# Patient Record
Sex: MALE | Race: ASIAN
[De-identification: names, ages, dates, MRNs, and addresses within clinical notes are randomized per-mention and may not be internally consistent; named-entity substitution may affect disease eponyms.]

---

## 2020-04-15 ENCOUNTER — HOSPITAL ENCOUNTER (INPATIENT)
Dept: HOSPITAL 12 - ER | Age: 75
LOS: 9 days | Discharge: NURSING, REHAB OR LONG-TERM CARE | DRG: 710 | End: 2020-04-24
Attending: INTERNAL MEDICINE
Payer: MEDICAID

## 2020-04-15 VITALS — WEIGHT: 150 LBS | HEIGHT: 66 IN | BODY MASS INDEX: 24.11 KG/M2

## 2020-04-15 VITALS — SYSTOLIC BLOOD PRESSURE: 158 MMHG | DIASTOLIC BLOOD PRESSURE: 65 MMHG

## 2020-04-15 DIAGNOSIS — G93.6: ICD-10-CM

## 2020-04-15 DIAGNOSIS — C79.31: ICD-10-CM

## 2020-04-15 DIAGNOSIS — I48.91: ICD-10-CM

## 2020-04-15 DIAGNOSIS — I11.0: ICD-10-CM

## 2020-04-15 DIAGNOSIS — K62.89: ICD-10-CM

## 2020-04-15 DIAGNOSIS — Z87.891: ICD-10-CM

## 2020-04-15 DIAGNOSIS — Z85.038: ICD-10-CM

## 2020-04-15 DIAGNOSIS — A41.9: Primary | ICD-10-CM

## 2020-04-15 DIAGNOSIS — D50.9: ICD-10-CM

## 2020-04-15 DIAGNOSIS — I61.8: ICD-10-CM

## 2020-04-15 DIAGNOSIS — I25.10: ICD-10-CM

## 2020-04-15 DIAGNOSIS — K43.9: ICD-10-CM

## 2020-04-15 DIAGNOSIS — E88.09: ICD-10-CM

## 2020-04-15 DIAGNOSIS — Z66: ICD-10-CM

## 2020-04-15 DIAGNOSIS — Z79.84: ICD-10-CM

## 2020-04-15 DIAGNOSIS — G31.84: ICD-10-CM

## 2020-04-15 DIAGNOSIS — J98.11: ICD-10-CM

## 2020-04-15 DIAGNOSIS — Z90.49: ICD-10-CM

## 2020-04-15 DIAGNOSIS — E83.42: ICD-10-CM

## 2020-04-15 DIAGNOSIS — J44.0: ICD-10-CM

## 2020-04-15 DIAGNOSIS — J18.1: ICD-10-CM

## 2020-04-15 DIAGNOSIS — D68.69: ICD-10-CM

## 2020-04-15 DIAGNOSIS — R65.20: ICD-10-CM

## 2020-04-15 DIAGNOSIS — E87.5: ICD-10-CM

## 2020-04-15 DIAGNOSIS — E22.2: ICD-10-CM

## 2020-04-15 DIAGNOSIS — E44.0: ICD-10-CM

## 2020-04-15 DIAGNOSIS — G92: ICD-10-CM

## 2020-04-15 DIAGNOSIS — Z92.21: ICD-10-CM

## 2020-04-15 DIAGNOSIS — I50.32: ICD-10-CM

## 2020-04-15 DIAGNOSIS — C78.01: ICD-10-CM

## 2020-04-15 DIAGNOSIS — R22.2: ICD-10-CM

## 2020-04-15 DIAGNOSIS — G83.21: ICD-10-CM

## 2020-04-15 DIAGNOSIS — I16.0: ICD-10-CM

## 2020-04-15 DIAGNOSIS — E11.9: ICD-10-CM

## 2020-04-15 DIAGNOSIS — Z85.6: ICD-10-CM

## 2020-04-15 DIAGNOSIS — C79.89: ICD-10-CM

## 2020-04-15 LAB
ALP SERPL-CCNC: 60 U/L (ref 50–136)
ALT SERPL W/O P-5'-P-CCNC: 17 U/L (ref 16–63)
APPEARANCE UR: CLEAR
AST SERPL-CCNC: 15 U/L (ref 15–37)
BASOPHILS # BLD AUTO: 0.1 K/UL (ref 0–8)
BASOPHILS NFR BLD AUTO: 0.9 % (ref 0–2)
BILIRUB DIRECT SERPL-MCNC: 0.2 MG/DL (ref 0–0.2)
BILIRUB SERPL-MCNC: 0.6 MG/DL (ref 0.2–1)
BILIRUB UR QL STRIP: NEGATIVE
BUN SERPL-MCNC: 14 MG/DL (ref 7–18)
CHLORIDE SERPL-SCNC: 89 MMOL/L (ref 98–107)
CO2 SERPL-SCNC: 25 MMOL/L (ref 21–32)
COLOR UR: YELLOW
CREAT SERPL-MCNC: 0.9 MG/DL (ref 0.6–1.3)
EOSINOPHIL # BLD AUTO: 0.4 K/UL (ref 0–0.7)
EOSINOPHIL NFR BLD AUTO: 4.7 % (ref 0–7)
GLUCOSE SERPL-MCNC: 132 MG/DL (ref 74–106)
GLUCOSE UR STRIP-MCNC: NEGATIVE MG/DL
HCT VFR BLD AUTO: 29.9 % (ref 36.7–47.1)
HGB BLD-MCNC: 10.2 G/DL (ref 12.5–16.3)
HGB UR QL STRIP: NEGATIVE
KETONES UR STRIP-MCNC: (no result) MG/DL
LEUKOCYTE ESTERASE UR QL STRIP: NEGATIVE
LIPASE SERPL-CCNC: 114 U/L (ref 73–393)
LYMPHOCYTES # BLD AUTO: 1.3 K/UL (ref 20–40)
LYMPHOCYTES NFR BLD AUTO: 15.5 % (ref 20.5–51.5)
MCH RBC QN AUTO: 26.8 UUG (ref 23.8–33.4)
MCHC RBC AUTO-ENTMCNC: 34 G/DL (ref 32.5–36.3)
MCV RBC AUTO: 78.3 FL (ref 73–96.2)
MONOCYTES # BLD AUTO: 0.6 K/UL (ref 2–10)
MONOCYTES NFR BLD AUTO: 7.3 % (ref 0–11)
NEUTROPHILS # BLD AUTO: 6.2 K/UL (ref 1.8–8.9)
NEUTROPHILS NFR BLD AUTO: 71.6 % (ref 38.5–71.5)
NITRITE UR QL STRIP: NEGATIVE
PH UR STRIP: 7 [PH] (ref 5–8)
PLATELET # BLD AUTO: 279 K/UL (ref 152–348)
POTASSIUM SERPL-SCNC: 3.8 MMOL/L (ref 3.5–5.1)
RBC # BLD AUTO: 3.82 MIL/UL (ref 4.06–5.63)
RBC #/AREA URNS HPF: (no result) /HPF (ref 0–3)
SP GR UR STRIP: 1.02 (ref 1–1.03)
UROBILINOGEN UR STRIP-MCNC: 0.2 E.U./DL
WBC # BLD AUTO: 8.7 K/UL (ref 3.6–10.2)
WBC #/AREA URNS HPF: (no result) /HPF (ref 0–3)
WS STN SPEC: 7.3 G/DL (ref 6.4–8.2)

## 2020-04-15 PROCEDURE — A4663 DIALYSIS BLOOD PRESSURE CUFF: HCPCS

## 2020-04-15 PROCEDURE — G0378 HOSPITAL OBSERVATION PER HR: HCPCS

## 2020-04-15 RX ADMIN — SODIUM CHLORIDE PRN MLS/HR: 0.9 INJECTION, SOLUTION INTRAVENOUS at 22:52

## 2020-04-15 NOTE — NUR
Dr. Dunlap on panel call with Ajit Moore DNP. Patient accepted for admission 
to [facility], diagnosis: pneumonia and hypertension.

## 2020-04-16 VITALS — DIASTOLIC BLOOD PRESSURE: 86 MMHG | SYSTOLIC BLOOD PRESSURE: 191 MMHG

## 2020-04-16 VITALS — DIASTOLIC BLOOD PRESSURE: 65 MMHG | SYSTOLIC BLOOD PRESSURE: 156 MMHG

## 2020-04-16 VITALS — SYSTOLIC BLOOD PRESSURE: 160 MMHG | DIASTOLIC BLOOD PRESSURE: 65 MMHG

## 2020-04-16 VITALS — DIASTOLIC BLOOD PRESSURE: 69 MMHG | SYSTOLIC BLOOD PRESSURE: 160 MMHG

## 2020-04-16 VITALS — SYSTOLIC BLOOD PRESSURE: 138 MMHG | DIASTOLIC BLOOD PRESSURE: 60 MMHG

## 2020-04-16 LAB
ALP SERPL-CCNC: 57 U/L (ref 50–136)
ALT SERPL W/O P-5'-P-CCNC: 17 U/L (ref 16–63)
AST SERPL-CCNC: 16 U/L (ref 15–37)
BASOPHILS # BLD AUTO: 0 K/UL (ref 0–8)
BASOPHILS NFR BLD AUTO: 0.5 % (ref 0–2)
BILIRUB SERPL-MCNC: 0.6 MG/DL (ref 0.2–1)
BUN SERPL-MCNC: 9 MG/DL (ref 7–18)
CHLORIDE SERPL-SCNC: 90 MMOL/L (ref 98–107)
CHOLEST SERPL-MCNC: 147 MG/DL (ref ?–200)
CO2 SERPL-SCNC: 24 MMOL/L (ref 21–32)
CREAT SERPL-MCNC: 0.7 MG/DL (ref 0.6–1.3)
EOSINOPHIL # BLD AUTO: 0.5 K/UL (ref 0–0.7)
EOSINOPHIL NFR BLD AUTO: 6 % (ref 0–7)
GLUCOSE SERPL-MCNC: 139 MG/DL (ref 74–106)
HCT VFR BLD AUTO: 29.4 % (ref 36.7–47.1)
HDLC SERPL-MCNC: 56 MG/DL (ref 40–60)
HGB BLD-MCNC: 10.1 G/DL (ref 12.5–16.3)
LYMPHOCYTES # BLD AUTO: 0.9 K/UL (ref 20–40)
LYMPHOCYTES NFR BLD AUTO: 10.3 % (ref 20.5–51.5)
MAGNESIUM SERPL-MCNC: 1.7 MG/DL (ref 1.8–2.4)
MCH RBC QN AUTO: 27 UUG (ref 23.8–33.4)
MCHC RBC AUTO-ENTMCNC: 34 G/DL (ref 32.5–36.3)
MCV RBC AUTO: 78.9 FL (ref 73–96.2)
MONOCYTES # BLD AUTO: 0.6 K/UL (ref 2–10)
MONOCYTES NFR BLD AUTO: 6.5 % (ref 0–11)
NEUTROPHILS # BLD AUTO: 6.6 K/UL (ref 1.8–8.9)
NEUTROPHILS NFR BLD AUTO: 76.7 % (ref 38.5–71.5)
PHOSPHATE SERPL-MCNC: 3 MG/DL (ref 2.5–4.9)
PLATELET # BLD AUTO: 248 K/UL (ref 152–348)
POTASSIUM SERPL-SCNC: 3.8 MMOL/L (ref 3.5–5.1)
RBC # BLD AUTO: 3.72 MIL/UL (ref 4.06–5.63)
TRIGL SERPL-MCNC: 65 MG/DL (ref 30–150)
TSH SERPL DL<=0.005 MIU/L-ACNC: 0.63 MIU/ML (ref 0.36–3.74)
WBC # BLD AUTO: 8.6 K/UL (ref 3.6–10.2)
WS STN SPEC: 6.7 G/DL (ref 6.4–8.2)

## 2020-04-16 RX ADMIN — Medication SCH EACH: at 21:06

## 2020-04-16 RX ADMIN — Medication SCH EACH: at 17:26

## 2020-04-16 RX ADMIN — AMLODIPINE BESYLATE SCH MG: 5 TABLET ORAL at 17:26

## 2020-04-16 RX ADMIN — ASPIRIN SCH MG: 81 TABLET, CHEWABLE ORAL at 09:32

## 2020-04-16 RX ADMIN — SODIUM CHLORIDE PRN MLS/HR: 0.9 INJECTION, SOLUTION INTRAVENOUS at 23:40

## 2020-04-16 RX ADMIN — PANTOPRAZOLE SODIUM SCH MG: 40 TABLET, DELAYED RELEASE ORAL at 09:33

## 2020-04-16 RX ADMIN — Medication SCH EACH: at 11:50

## 2020-04-16 RX ADMIN — Medication SCH EACH: at 06:32

## 2020-04-16 RX ADMIN — FLUTICASONE FUROATE AND VILANTEROL TRIFENATATE SCH EACH: 100; 25 POWDER RESPIRATORY (INHALATION) at 10:42

## 2020-04-16 RX ADMIN — DEXTROSE SCH MLS/HR: 5 SOLUTION INTRAVENOUS at 22:20

## 2020-04-16 RX ADMIN — SODIUM CHLORIDE PRN UNIT: 9 INJECTION, SOLUTION INTRAVENOUS at 08:51

## 2020-04-16 RX ADMIN — SODIUM CHLORIDE PRN UNIT: 9 INJECTION, SOLUTION INTRAVENOUS at 20:57

## 2020-04-16 RX ADMIN — MAGNESIUM HYDROXIDE PRN ML: 400 SUSPENSION ORAL at 20:57

## 2020-04-16 RX ADMIN — DEXTROSE SCH MLS/HR: 50 INJECTION, SOLUTION INTRAVENOUS at 20:11

## 2020-04-16 RX ADMIN — MAGNESIUM SULFATE IN DEXTROSE SCH MLS/HR: 10 INJECTION, SOLUTION INTRAVENOUS at 12:06

## 2020-04-16 RX ADMIN — SODIUM CHLORIDE PRN UNIT: 9 INJECTION, SOLUTION INTRAVENOUS at 17:28

## 2020-04-16 RX ADMIN — MAGNESIUM SULFATE IN DEXTROSE SCH MLS/HR: 10 INJECTION, SOLUTION INTRAVENOUS at 10:43

## 2020-04-16 NOTE — NUR
received pt. resting in bed alert oriented x4. Pt. denies pain/ discomfort. Pt. denies sob/ 
difficulty breathing. IV in R forearm 20 gauge intact patent running prescribed fluids. Pt. 
is on tele monitor sinus rhythm. Upon checking vital signs BP elevated at 191/86 HR 93. Pt. 
is asymptomatic, pt. states he takes BP medications. Informed Hospitalist Oscar of 
elevated BP and home meds that need to be reconciled. Safety measures in place. call light 
within reach. will continue to monitor pt.

## 2020-04-16 NOTE — NUR
Patient is resting comfortably in bed, A&Ox4. Pt is in room air and denies sob, no signs of 
respiratory distress. Pt denies any pain. Pt is on tele monitor, sinus rhythm. Last BP check 
was 160/69. BP medications has been taken. Total urine output was 950ml. Safety measures in 
place, bed in lowest position, bed alarm on, call light within reach. Will continue to 
monitor.

## 2020-04-16 NOTE — NUR
awake,up in the bathroom to have a bowel movement. no bm noted.unable to have a bowel 
movement . prune juice and tea given plus milk of magnesia.coughing non productive cough,sob 
upon movement. vital signs stable.. up and about in the commode.verbalized very weak.

## 2020-04-16 NOTE — NUR
Admitted  Mr Lobo to room 306; comfortable on room air; SOB on exertion; pt has urinary 
urgency and would prefer to urinate standing up and use the urinal; diminished breath 
sounds; SR on tele; DR Calle was called to give orders for HHN and accucheck; orders given; 
will endorse to next RN to follow up with home meds; needs attended; safety maintained. plan 
of care initiated; continue to monitor.

## 2020-04-17 VITALS — DIASTOLIC BLOOD PRESSURE: 63 MMHG | SYSTOLIC BLOOD PRESSURE: 150 MMHG

## 2020-04-17 VITALS — DIASTOLIC BLOOD PRESSURE: 79 MMHG | SYSTOLIC BLOOD PRESSURE: 164 MMHG

## 2020-04-17 VITALS — SYSTOLIC BLOOD PRESSURE: 148 MMHG | DIASTOLIC BLOOD PRESSURE: 72 MMHG

## 2020-04-17 VITALS — DIASTOLIC BLOOD PRESSURE: 65 MMHG | SYSTOLIC BLOOD PRESSURE: 149 MMHG

## 2020-04-17 VITALS — SYSTOLIC BLOOD PRESSURE: 145 MMHG | DIASTOLIC BLOOD PRESSURE: 65 MMHG

## 2020-04-17 LAB
ALP SERPL-CCNC: 62 U/L (ref 50–136)
ALT SERPL W/O P-5'-P-CCNC: 17 U/L (ref 16–63)
AST SERPL-CCNC: 13 U/L (ref 15–37)
BASOPHILS # BLD AUTO: 0 K/UL (ref 0–8)
BASOPHILS NFR BLD AUTO: 0.5 % (ref 0–2)
BILIRUB SERPL-MCNC: 0.6 MG/DL (ref 0.2–1)
BUN SERPL-MCNC: 10 MG/DL (ref 7–18)
CHLORIDE SERPL-SCNC: 88 MMOL/L (ref 98–107)
CO2 SERPL-SCNC: 25 MMOL/L (ref 21–32)
CREAT SERPL-MCNC: 0.8 MG/DL (ref 0.6–1.3)
EOSINOPHIL # BLD AUTO: 0.4 K/UL (ref 0–0.7)
EOSINOPHIL NFR BLD AUTO: 3.9 % (ref 0–7)
GLUCOSE SERPL-MCNC: 283 MG/DL (ref 74–106)
HCT VFR BLD AUTO: 31.3 % (ref 36.7–47.1)
HGB BLD-MCNC: 10.6 G/DL (ref 12.5–16.3)
LYMPHOCYTES # BLD AUTO: 0.9 K/UL (ref 20–40)
LYMPHOCYTES NFR BLD AUTO: 9.7 % (ref 20.5–51.5)
MAGNESIUM SERPL-MCNC: 2 MG/DL (ref 1.8–2.4)
MCH RBC QN AUTO: 26.5 UUG (ref 23.8–33.4)
MCHC RBC AUTO-ENTMCNC: 34 G/DL (ref 32.5–36.3)
MCV RBC AUTO: 78.7 FL (ref 73–96.2)
MONOCYTES # BLD AUTO: 0.5 K/UL (ref 2–10)
MONOCYTES NFR BLD AUTO: 5 % (ref 0–11)
NEUTROPHILS # BLD AUTO: 7.9 K/UL (ref 1.8–8.9)
NEUTROPHILS NFR BLD AUTO: 80.9 % (ref 38.5–71.5)
PHOSPHATE SERPL-MCNC: 2.8 MG/DL (ref 2.5–4.9)
PLATELET # BLD AUTO: 278 K/UL (ref 152–348)
POTASSIUM SERPL-SCNC: 3.7 MMOL/L (ref 3.5–5.1)
RBC # BLD AUTO: 3.98 MIL/UL (ref 4.06–5.63)
WBC # BLD AUTO: 9.7 K/UL (ref 3.6–10.2)
WS STN SPEC: 7.1 G/DL (ref 6.4–8.2)

## 2020-04-17 RX ADMIN — IPRATROPIUM BROMIDE PRN MG: 0.5 SOLUTION RESPIRATORY (INHALATION) at 14:32

## 2020-04-17 RX ADMIN — LOSARTAN POTASSIUM SCH MG: 25 TABLET, FILM COATED ORAL at 21:10

## 2020-04-17 RX ADMIN — LOSARTAN POTASSIUM SCH MG: 25 TABLET, FILM COATED ORAL at 08:55

## 2020-04-17 RX ADMIN — DEXTROSE SCH MLS/HR: 50 INJECTION, SOLUTION INTRAVENOUS at 20:09

## 2020-04-17 RX ADMIN — DEXTROSE SCH MLS/HR: 5 SOLUTION INTRAVENOUS at 21:20

## 2020-04-17 RX ADMIN — Medication SCH EACH: at 16:30

## 2020-04-17 RX ADMIN — ACETAMINOPHEN PRN MG: 325 TABLET ORAL at 20:10

## 2020-04-17 RX ADMIN — AMLODIPINE BESYLATE SCH MG: 5 TABLET ORAL at 17:30

## 2020-04-17 RX ADMIN — Medication SCH EACH: at 21:16

## 2020-04-17 RX ADMIN — SODIUM CHLORIDE PRN UNIT: 9 INJECTION, SOLUTION INTRAVENOUS at 17:41

## 2020-04-17 RX ADMIN — Medication SCH EACH: at 11:57

## 2020-04-17 RX ADMIN — ASPIRIN SCH MG: 81 TABLET, CHEWABLE ORAL at 08:11

## 2020-04-17 RX ADMIN — Medication SCH EACH: at 07:06

## 2020-04-17 RX ADMIN — SODIUM CHLORIDE PRN UNIT: 9 INJECTION, SOLUTION INTRAVENOUS at 21:23

## 2020-04-17 RX ADMIN — SODIUM CHLORIDE PRN UNIT: 9 INJECTION, SOLUTION INTRAVENOUS at 08:15

## 2020-04-17 RX ADMIN — ALBUTEROL SULFATE PRN MG: 2.5 SOLUTION RESPIRATORY (INHALATION) at 14:32

## 2020-04-17 RX ADMIN — PANTOPRAZOLE SODIUM SCH MG: 40 TABLET, DELAYED RELEASE ORAL at 06:45

## 2020-04-17 RX ADMIN — DILTIAZEM HYDROCHLORIDE SCH MG: 120 CAPSULE, EXTENDED RELEASE ORAL at 07:08

## 2020-04-17 RX ADMIN — AMLODIPINE BESYLATE SCH MG: 5 TABLET ORAL at 08:12

## 2020-04-17 RX ADMIN — FLUTICASONE FUROATE AND VILANTEROL TRIFENATATE SCH EACH: 100; 25 POWDER RESPIRATORY (INHALATION) at 08:13

## 2020-04-17 NOTE — NUR
pt. resting in bed alert oriented x4. Pt. denies pain/ discomfort. Pt. denies sob/ 
difficulty breathing. IV in R forearm 20 gauge intact patent. Pt. is on tele monitor sinus 
rhythm.  Safety measures in place. call light within reach. will continue to monitor pt.

## 2020-04-18 VITALS — SYSTOLIC BLOOD PRESSURE: 138 MMHG | DIASTOLIC BLOOD PRESSURE: 53 MMHG

## 2020-04-18 VITALS — SYSTOLIC BLOOD PRESSURE: 136 MMHG | DIASTOLIC BLOOD PRESSURE: 61 MMHG

## 2020-04-18 VITALS — DIASTOLIC BLOOD PRESSURE: 53 MMHG | SYSTOLIC BLOOD PRESSURE: 141 MMHG

## 2020-04-18 VITALS — SYSTOLIC BLOOD PRESSURE: 135 MMHG | DIASTOLIC BLOOD PRESSURE: 56 MMHG

## 2020-04-18 LAB
ALP SERPL-CCNC: 58 U/L (ref 50–136)
ALT SERPL W/O P-5'-P-CCNC: 13 U/L (ref 16–63)
AST SERPL-CCNC: 20 U/L (ref 15–37)
BASOPHILS # BLD AUTO: 0 K/UL (ref 0–8)
BASOPHILS NFR BLD AUTO: 0.5 % (ref 0–2)
BILIRUB SERPL-MCNC: 0.4 MG/DL (ref 0.2–1)
BUN SERPL-MCNC: 7 MG/DL (ref 7–18)
CHLORIDE SERPL-SCNC: 91 MMOL/L (ref 98–107)
CO2 SERPL-SCNC: 28 MMOL/L (ref 21–32)
CREAT SERPL-MCNC: 0.8 MG/DL (ref 0.6–1.3)
EOSINOPHIL # BLD AUTO: 0.6 K/UL (ref 0–0.7)
EOSINOPHIL NFR BLD AUTO: 6 % (ref 0–7)
FERRITIN SERPL-MCNC: 208 NG/ML (ref 26–388)
GLUCOSE SERPL-MCNC: 125 MG/DL (ref 74–106)
HCT VFR BLD AUTO: 30.3 % (ref 36.7–47.1)
HEMOCCULT STL QL: NEGATIVE
HGB BLD-MCNC: 10.3 G/DL (ref 12.5–16.3)
IRON SERPL-MCNC: 21 UG/DL (ref 50–175)
LYMPHOCYTES # BLD AUTO: 1.1 K/UL (ref 20–40)
LYMPHOCYTES NFR BLD AUTO: 11.5 % (ref 20.5–51.5)
MAGNESIUM SERPL-MCNC: 2 MG/DL (ref 1.8–2.4)
MCH RBC QN AUTO: 26.6 UUG (ref 23.8–33.4)
MCHC RBC AUTO-ENTMCNC: 34 G/DL (ref 32.5–36.3)
MCV RBC AUTO: 77.8 FL (ref 73–96.2)
MONOCYTES # BLD AUTO: 0.6 K/UL (ref 2–10)
MONOCYTES NFR BLD AUTO: 6.1 % (ref 0–11)
NEUTROPHILS # BLD AUTO: 7 K/UL (ref 1.8–8.9)
NEUTROPHILS NFR BLD AUTO: 75.9 % (ref 38.5–71.5)
PHOSPHATE SERPL-MCNC: 3.6 MG/DL (ref 2.5–4.9)
PLATELET # BLD AUTO: 252 K/UL (ref 152–348)
POTASSIUM SERPL-SCNC: 3.8 MMOL/L (ref 3.5–5.1)
RBC # BLD AUTO: 3.89 MIL/UL (ref 4.06–5.63)
TSH SERPL DL<=0.005 MIU/L-ACNC: 0.72 MIU/ML (ref 0.36–3.74)
WBC # BLD AUTO: 9.2 K/UL (ref 3.6–10.2)
WS STN SPEC: 6.9 G/DL (ref 6.4–8.2)

## 2020-04-18 RX ADMIN — ASPIRIN SCH MG: 81 TABLET, CHEWABLE ORAL at 08:28

## 2020-04-18 RX ADMIN — SODIUM CHLORIDE PRN UNIT: 9 INJECTION, SOLUTION INTRAVENOUS at 07:54

## 2020-04-18 RX ADMIN — DEXTROSE SCH MLS/HR: 50 INJECTION, SOLUTION INTRAVENOUS at 20:29

## 2020-04-18 RX ADMIN — LOSARTAN POTASSIUM SCH MG: 25 TABLET, FILM COATED ORAL at 08:28

## 2020-04-18 RX ADMIN — FLUTICASONE FUROATE AND VILANTEROL TRIFENATATE SCH EACH: 100; 25 POWDER RESPIRATORY (INHALATION) at 09:03

## 2020-04-18 RX ADMIN — PANTOPRAZOLE SODIUM SCH MG: 40 TABLET, DELAYED RELEASE ORAL at 06:12

## 2020-04-18 RX ADMIN — AMLODIPINE BESYLATE SCH MG: 5 TABLET ORAL at 16:46

## 2020-04-18 RX ADMIN — DEXTROSE SCH MLS/HR: 5 SOLUTION INTRAVENOUS at 21:23

## 2020-04-18 RX ADMIN — Medication SCH EACH: at 11:30

## 2020-04-18 RX ADMIN — AMLODIPINE BESYLATE SCH MG: 5 TABLET ORAL at 08:28

## 2020-04-18 RX ADMIN — LOSARTAN POTASSIUM SCH MG: 25 TABLET, FILM COATED ORAL at 21:13

## 2020-04-18 RX ADMIN — Medication SCH EACH: at 16:19

## 2020-04-18 RX ADMIN — Medication SCH EACH: at 20:46

## 2020-04-18 RX ADMIN — DILTIAZEM HYDROCHLORIDE SCH MG: 120 CAPSULE, EXTENDED RELEASE ORAL at 08:28

## 2020-04-18 RX ADMIN — SODIUM CHLORIDE PRN UNIT: 9 INJECTION, SOLUTION INTRAVENOUS at 11:52

## 2020-04-18 RX ADMIN — Medication SCH EACH: at 06:30

## 2020-04-18 NOTE — NUR
Patient in bed, awake, alert and verbally responsive. No signs of distress noted. No SON. 
Pain medication given as ordered for Right Chest Pain, Seen and examined by Dr. Landis 
with STAT EKG ordered. EKG resulted Normal Sinus Rhythm, Dr. Landis is aware. Last Blood 
Sugar is 122, No insulin given per sliding scale. All needs attended and met. kept clean and 
comfortable. Will endorse to Oncoming Nurse.

## 2020-04-18 NOTE — NUR
Patient spoke with Dr. Velez on the phone, asking if he wants to do any colonoscopy 
procedure, patient refused.

## 2020-04-18 NOTE — NUR
Received patient in bed, awake and verbally responsive. No signs of distress noted. No SOB. 
No complain at this time. 0600 Blood sugar is 148, will give 2 units insulin per sliding 
scale before breakfast. Kept clean and comfortable. kept the call light within easy reach. 
Will continue to monitor.

## 2020-04-19 VITALS — SYSTOLIC BLOOD PRESSURE: 128 MMHG | DIASTOLIC BLOOD PRESSURE: 63 MMHG

## 2020-04-19 VITALS — SYSTOLIC BLOOD PRESSURE: 118 MMHG | DIASTOLIC BLOOD PRESSURE: 58 MMHG

## 2020-04-19 VITALS — DIASTOLIC BLOOD PRESSURE: 62 MMHG | SYSTOLIC BLOOD PRESSURE: 143 MMHG

## 2020-04-19 VITALS — DIASTOLIC BLOOD PRESSURE: 65 MMHG | SYSTOLIC BLOOD PRESSURE: 132 MMHG

## 2020-04-19 VITALS — SYSTOLIC BLOOD PRESSURE: 143 MMHG | DIASTOLIC BLOOD PRESSURE: 62 MMHG

## 2020-04-19 VITALS — DIASTOLIC BLOOD PRESSURE: 54 MMHG | SYSTOLIC BLOOD PRESSURE: 115 MMHG

## 2020-04-19 LAB
ALP SERPL-CCNC: 49 U/L (ref 50–136)
ALT SERPL W/O P-5'-P-CCNC: 14 U/L (ref 16–63)
AST SERPL-CCNC: 21 U/L (ref 15–37)
BILIRUB SERPL-MCNC: 0.4 MG/DL (ref 0.2–1)
BUN SERPL-MCNC: 8 MG/DL (ref 7–18)
CHLORIDE SERPL-SCNC: 91 MMOL/L (ref 98–107)
CO2 SERPL-SCNC: 28 MMOL/L (ref 21–32)
CREAT SERPL-MCNC: 0.8 MG/DL (ref 0.6–1.3)
GLUCOSE SERPL-MCNC: 147 MG/DL (ref 74–106)
IGA SERPL-MCNC: 136 MG/DL (ref 61–437)
IGG SERPL-MCNC: 1105 MG/DL (ref 603–1613)
IGM SERPL-MCNC: 27 MG/DL (ref 15–143)
MAGNESIUM SERPL-MCNC: 1.6 MG/DL (ref 1.8–2.4)
PHOSPHATE SERPL-MCNC: 3.5 MG/DL (ref 2.5–4.9)
POTASSIUM SERPL-SCNC: 4 MMOL/L (ref 3.5–5.1)
WS STN SPEC: 6.7 G/DL (ref 6.4–8.2)

## 2020-04-19 RX ADMIN — SODIUM CHLORIDE PRN UNIT: 9 INJECTION, SOLUTION INTRAVENOUS at 08:43

## 2020-04-19 RX ADMIN — MAGNESIUM SULFATE IN DEXTROSE SCH MLS/HR: 10 INJECTION, SOLUTION INTRAVENOUS at 10:14

## 2020-04-19 RX ADMIN — Medication SCH EACH: at 21:21

## 2020-04-19 RX ADMIN — Medication SCH EACH: at 17:16

## 2020-04-19 RX ADMIN — AMLODIPINE BESYLATE SCH MG: 5 TABLET ORAL at 17:08

## 2020-04-19 RX ADMIN — Medication SCH EACH: at 12:30

## 2020-04-19 RX ADMIN — SODIUM CHLORIDE SCH MLS/HR: 9 INJECTION, SOLUTION INTRAVENOUS at 14:38

## 2020-04-19 RX ADMIN — FLUTICASONE FUROATE AND VILANTEROL TRIFENATATE SCH EACH: 100; 25 POWDER RESPIRATORY (INHALATION) at 08:40

## 2020-04-19 RX ADMIN — DILTIAZEM HYDROCHLORIDE SCH MG: 120 CAPSULE, EXTENDED RELEASE ORAL at 08:44

## 2020-04-19 RX ADMIN — PANTOPRAZOLE SODIUM SCH MG: 40 TABLET, DELAYED RELEASE ORAL at 06:00

## 2020-04-19 RX ADMIN — AMLODIPINE BESYLATE SCH MG: 5 TABLET ORAL at 08:43

## 2020-04-19 RX ADMIN — Medication SCH EACH: at 06:31

## 2020-04-19 RX ADMIN — ASPIRIN SCH MG: 81 TABLET, CHEWABLE ORAL at 08:41

## 2020-04-19 RX ADMIN — DEXTROSE SCH MLS/HR: 50 INJECTION, SOLUTION INTRAVENOUS at 20:00

## 2020-04-19 RX ADMIN — SODIUM CHLORIDE PRN UNIT: 9 INJECTION, SOLUTION INTRAVENOUS at 21:23

## 2020-04-19 RX ADMIN — MAGNESIUM SULFATE IN DEXTROSE SCH MLS/HR: 10 INJECTION, SOLUTION INTRAVENOUS at 11:12

## 2020-04-19 RX ADMIN — LOSARTAN POTASSIUM SCH MG: 25 TABLET, FILM COATED ORAL at 08:44

## 2020-04-19 RX ADMIN — LOSARTAN POTASSIUM SCH MG: 25 TABLET, FILM COATED ORAL at 21:22

## 2020-04-19 RX ADMIN — DEXTROSE SCH MLS/HR: 5 SOLUTION INTRAVENOUS at 22:00

## 2020-04-19 RX ADMIN — SODIUM CHLORIDE PRN UNIT: 9 INJECTION, SOLUTION INTRAVENOUS at 14:44

## 2020-04-19 NOTE — NUR
Attempt to get hold of dr Moore by calling different floors  to give result will place a 
call if no response.

## 2020-04-19 NOTE — NUR
Follow up called made to radiology department spoke with CHRIS about any f/u on the stat MRI 
ordered.  CHRIS will call MRI department for follow up.

## 2020-04-19 NOTE — NUR
Spoke with patient in [facility].  PT is very well alert and oriented x4.  Discussed code 
status with pt.  Pt states that he doesnt want any tubes and to be intubated and put on the 
ventilator.  Pt states that he wants to talk with his daughter first to let her know of his 
wants re code status.  Pt states that his daughter wants him to be full code.   ordered 
social service eval to clarify situation.

## 2020-04-19 NOTE — NUR
Called epic group for dr on call to report the mri result. waiting for call back. Sent  
message regarding the mri result to dr mcallister. Pt in no acute distress.

## 2020-04-19 NOTE — NUR
PT ARRIVED FROM [facility]. PT IN NO ACUTE DISTRESS. VITAL SIGNS WITHIN NORMAL 
LIMIT.. PT WANTS TO EAT ALREADY . SAFETY AND COMFORT PROVIDED.  [facility] UNIT 113 BROUGHT 
THE PT TO HIS ROOM. WILL CONTINUE TO MONITOR.

## 2020-04-19 NOTE — NUR
No call back from Dr becerra placed a call to DR TAYLOR who ordered the CT head result given 
to DR taylor new order received for MRI w/wo contrast of the brain.

## 2020-04-20 VITALS — SYSTOLIC BLOOD PRESSURE: 113 MMHG | DIASTOLIC BLOOD PRESSURE: 53 MMHG

## 2020-04-20 VITALS — DIASTOLIC BLOOD PRESSURE: 64 MMHG | SYSTOLIC BLOOD PRESSURE: 120 MMHG

## 2020-04-20 VITALS — DIASTOLIC BLOOD PRESSURE: 70 MMHG | SYSTOLIC BLOOD PRESSURE: 128 MMHG

## 2020-04-20 VITALS — DIASTOLIC BLOOD PRESSURE: 52 MMHG | SYSTOLIC BLOOD PRESSURE: 130 MMHG

## 2020-04-20 VITALS — SYSTOLIC BLOOD PRESSURE: 110 MMHG | DIASTOLIC BLOOD PRESSURE: 54 MMHG

## 2020-04-20 VITALS — DIASTOLIC BLOOD PRESSURE: 50 MMHG | SYSTOLIC BLOOD PRESSURE: 118 MMHG

## 2020-04-20 LAB
BASOPHILS # BLD AUTO: 0 K/UL (ref 0–8)
BASOPHILS NFR BLD AUTO: 0.1 % (ref 0–2)
BUN SERPL-MCNC: 10 MG/DL (ref 7–18)
CHLORIDE SERPL-SCNC: 91 MMOL/L (ref 98–107)
CO2 SERPL-SCNC: 28 MMOL/L (ref 21–32)
CREAT SERPL-MCNC: 0.9 MG/DL (ref 0.6–1.3)
EOSINOPHIL # BLD AUTO: 0 K/UL (ref 0–0.7)
EOSINOPHIL NFR BLD AUTO: 0.1 % (ref 0–7)
GLUCOSE SERPL-MCNC: 176 MG/DL (ref 74–106)
HCT VFR BLD AUTO: 29.3 % (ref 36.7–47.1)
HGB BLD-MCNC: 9.8 G/DL (ref 12.5–16.3)
LYMPHOCYTES # BLD AUTO: 0.4 K/UL (ref 20–40)
LYMPHOCYTES NFR BLD AUTO: 3.1 % (ref 20.5–51.5)
MAGNESIUM SERPL-MCNC: 2.2 MG/DL (ref 1.8–2.4)
MCH RBC QN AUTO: 26.1 UUG (ref 23.8–33.4)
MCHC RBC AUTO-ENTMCNC: 34 G/DL (ref 32.5–36.3)
MCV RBC AUTO: 78 FL (ref 73–96.2)
MONOCYTES # BLD AUTO: 0.1 K/UL (ref 2–10)
MONOCYTES NFR BLD AUTO: 0.9 % (ref 0–11)
NEUTROPHILS # BLD AUTO: 12.8 K/UL (ref 1.8–8.9)
NEUTROPHILS NFR BLD AUTO: 95.8 % (ref 38.5–71.5)
PHOSPHATE SERPL-MCNC: 3.8 MG/DL (ref 2.5–4.9)
PLATELET # BLD AUTO: 237 K/UL (ref 152–348)
POTASSIUM SERPL-SCNC: 4.4 MMOL/L (ref 3.5–5.1)
RBC # BLD AUTO: 3.75 MIL/UL (ref 4.06–5.63)
WBC # BLD AUTO: 13.3 K/UL (ref 3.6–10.2)

## 2020-04-20 RX ADMIN — Medication SCH EACH: at 12:21

## 2020-04-20 RX ADMIN — Medication SCH EACH: at 16:22

## 2020-04-20 RX ADMIN — FLUTICASONE FUROATE AND VILANTEROL TRIFENATATE SCH EACH: 100; 25 POWDER RESPIRATORY (INHALATION) at 08:19

## 2020-04-20 RX ADMIN — LOSARTAN POTASSIUM SCH MG: 25 TABLET, FILM COATED ORAL at 22:02

## 2020-04-20 RX ADMIN — SODIUM CHLORIDE PRN UNIT: 9 INJECTION, SOLUTION INTRAVENOUS at 08:28

## 2020-04-20 RX ADMIN — AMLODIPINE BESYLATE SCH MG: 5 TABLET ORAL at 08:23

## 2020-04-20 RX ADMIN — LOSARTAN POTASSIUM SCH MG: 25 TABLET, FILM COATED ORAL at 08:22

## 2020-04-20 RX ADMIN — AMLODIPINE BESYLATE SCH MG: 5 TABLET ORAL at 18:51

## 2020-04-20 RX ADMIN — Medication SCH EACH: at 22:01

## 2020-04-20 RX ADMIN — DEXAMETHASONE SODIUM PHOSPHATE SCH MG: 4 INJECTION, SOLUTION INTRAMUSCULAR; INTRAVENOUS at 22:01

## 2020-04-20 RX ADMIN — SODIUM CHLORIDE SCH MLS/HR: 9 INJECTION, SOLUTION INTRAVENOUS at 16:21

## 2020-04-20 RX ADMIN — SODIUM CHLORIDE PRN UNIT: 9 INJECTION, SOLUTION INTRAVENOUS at 12:25

## 2020-04-20 RX ADMIN — DEXTROSE SCH MLS/HR: 50 INJECTION, SOLUTION INTRAVENOUS at 20:16

## 2020-04-20 RX ADMIN — SODIUM CHLORIDE PRN UNIT: 9 INJECTION, SOLUTION INTRAVENOUS at 22:17

## 2020-04-20 RX ADMIN — PANTOPRAZOLE SODIUM SCH MG: 40 TABLET, DELAYED RELEASE ORAL at 06:09

## 2020-04-20 RX ADMIN — DEXTROSE SCH MLS/HR: 5 SOLUTION INTRAVENOUS at 22:04

## 2020-04-20 RX ADMIN — SODIUM CHLORIDE PRN UNIT: 9 INJECTION, SOLUTION INTRAVENOUS at 16:23

## 2020-04-20 RX ADMIN — DEXAMETHASONE SODIUM PHOSPHATE SCH MG: 4 INJECTION, SOLUTION INTRAMUSCULAR; INTRAVENOUS at 08:24

## 2020-04-20 RX ADMIN — Medication SCH EACH: at 06:33

## 2020-04-20 RX ADMIN — DILTIAZEM HYDROCHLORIDE SCH MG: 120 CAPSULE, EXTENDED RELEASE ORAL at 08:21

## 2020-04-20 NOTE — NUR
PATIENT CALM AND COMFORTABLE THROUGH OUT SHIFT WITH NO SIGNS OF DISTRESS; PATIENT MEDICATION 
COMPLIANT; PATIENT WITH STABLE VITAL SIGNS THROUGH OUT SHIFT. REPORT GIVEN TO ONCOMING 
NURSE.

## 2020-04-20 NOTE — NUR
PT IN NO ACUTE DISTRESS . PRESCRIBED MEDICATION GIVEN AND PT TOLERATED IT WELL. SAFETY AND 
COMFORT PROVIDED. WILL ENDORSE TO INCOMING NURSE FOR CONTINUITY OF CARE.

## 2020-04-20 NOTE — NUR
Consultation:



12:00pm:  SW met with this patient today, who was in his bed, awake, alert, receptive to 
meeting with this SW.  Patient is a 74 year old male, who was brought in to this hospital on 
04/15/20 by his granddaughter, for SOB and weakness.  Patient's medical history includes 
COPD, SOB, HTN, DM, cancer, and per MD notes dated 04/16/20, a recent finding of left 
parietal hemorrhage with surrounding vasogenic edema.  Patient is oriented x 3, knew his 
name, knew the date and year, knew he was in the hospital for weakness.  Patient lives in 
[place] with his daughter Mary Rojas [phone], and 3 grandchildren.  Patient states 
that prior to this hospitalization, patient was ambulatory and able to tend to basic ADL's 
in the house, and used a FWW when ambulating outdoors.  Patient states his family assisted 
him with IADL's.  Patient reports being a smoker, however states that he quit over 1 year 
ago.  SW explored patient's emotional well-being and administered the PHQ-9. Patient 
expressed feeling sad at times due to missing his children in the [facility], and that he 
sometimes does not have the appetite to eat much because of his medical conditions.  
However, patient reported that he has been feeling like this for a while, and that he has 
not noticed any sudden and unexpected changes in his mood over the last few weeks.  SW 
allowed patient to express his feelings and provided supportive counseling.  SW screened for 
SI, and patient denied past or current SI.  Discharge plans discussed, and patient expressed 
wanting to return home when possible.  SW explained to the patient that case management 
would be assisting him with discharge planning when the doctor feels it is medically 
appropriate for discharge, and patient expressed understanding.  No further SS interventions 
needed at this time, however SW will continue to be available to the patient, as needed.

## 2020-04-20 NOTE — NUR
0027h Dr. Bhargav Calle called and ordered Decadron 10 mg and Keppra 500 mg tablet. Pt in no 
acute distress. Safety and comfort provided. will continue to monitor.

## 2020-04-21 VITALS — SYSTOLIC BLOOD PRESSURE: 116 MMHG | DIASTOLIC BLOOD PRESSURE: 53 MMHG

## 2020-04-21 VITALS — SYSTOLIC BLOOD PRESSURE: 122 MMHG | DIASTOLIC BLOOD PRESSURE: 53 MMHG

## 2020-04-21 VITALS — SYSTOLIC BLOOD PRESSURE: 124 MMHG | DIASTOLIC BLOOD PRESSURE: 50 MMHG

## 2020-04-21 VITALS — SYSTOLIC BLOOD PRESSURE: 115 MMHG | DIASTOLIC BLOOD PRESSURE: 45 MMHG

## 2020-04-21 VITALS — DIASTOLIC BLOOD PRESSURE: 49 MMHG | SYSTOLIC BLOOD PRESSURE: 126 MMHG

## 2020-04-21 VITALS — SYSTOLIC BLOOD PRESSURE: 119 MMHG | DIASTOLIC BLOOD PRESSURE: 69 MMHG

## 2020-04-21 LAB
ALBUMIN SERPL ELPH-MCNC: 3.2 G/DL (ref 2.9–4.4)
ALBUMIN/GLOB SERPL: 1.1 {RATIO} (ref 0.7–1.7)
ALPHA1 GLOB SERPL ELPH-MCNC: 0.3 G/DL (ref 0–0.4)
ALPHA2 GLOB SERPL ELPH-MCNC: 0.7 G/DL (ref 0.4–1)
B-GLOBULIN SERPL ELPH-MCNC: 1.1 G/DL (ref 0.7–1.3)
BUN SERPL-MCNC: 21 MG/DL (ref 7–18)
CHLORIDE SERPL-SCNC: 87 MMOL/L (ref 98–107)
CO2 SERPL-SCNC: 28 MMOL/L (ref 21–32)
CREAT SERPL-MCNC: 1 MG/DL (ref 0.6–1.3)
GAMMA GLOB SERPL ELPH-MCNC: 0.9 G/DL (ref 0.4–1.8)
GLOBULIN SER CALC-MCNC: 2.9 G/DL (ref 2.2–3.9)
GLUCOSE SERPL-MCNC: 207 MG/DL (ref 74–106)
MAGNESIUM SERPL-MCNC: 2.2 MG/DL (ref 1.8–2.4)
PHOSPHATE SERPL-MCNC: 3.8 MG/DL (ref 2.5–4.9)
POTASSIUM SERPL-SCNC: 4.2 MMOL/L (ref 3.5–5.1)
URATE SERPL-MCNC: 2.8 MG/DL (ref 3.5–7.2)

## 2020-04-21 RX ADMIN — LOSARTAN POTASSIUM SCH MG: 25 TABLET, FILM COATED ORAL at 10:19

## 2020-04-21 RX ADMIN — Medication SCH EACH: at 06:10

## 2020-04-21 RX ADMIN — Medication SCH EACH: at 11:54

## 2020-04-21 RX ADMIN — Medication SCH EACH: at 20:38

## 2020-04-21 RX ADMIN — AMLODIPINE BESYLATE SCH MG: 5 TABLET ORAL at 17:00

## 2020-04-21 RX ADMIN — LOSARTAN POTASSIUM SCH MG: 25 TABLET, FILM COATED ORAL at 20:26

## 2020-04-21 RX ADMIN — DEXTROSE SCH MLS/HR: 50 INJECTION, SOLUTION INTRAVENOUS at 20:38

## 2020-04-21 RX ADMIN — DEXAMETHASONE SODIUM PHOSPHATE SCH MG: 4 INJECTION, SOLUTION INTRAMUSCULAR; INTRAVENOUS at 20:16

## 2020-04-21 RX ADMIN — SODIUM CHLORIDE PRN UNIT: 9 INJECTION, SOLUTION INTRAVENOUS at 20:41

## 2020-04-21 RX ADMIN — DEXAMETHASONE SODIUM PHOSPHATE SCH MG: 4 INJECTION, SOLUTION INTRAMUSCULAR; INTRAVENOUS at 10:20

## 2020-04-21 RX ADMIN — FLUTICASONE FUROATE AND VILANTEROL TRIFENATATE SCH EACH: 100; 25 POWDER RESPIRATORY (INHALATION) at 10:21

## 2020-04-21 RX ADMIN — LOSARTAN POTASSIUM SCH MG: 25 TABLET, FILM COATED ORAL at 20:41

## 2020-04-21 RX ADMIN — Medication SCH EACH: at 17:19

## 2020-04-21 RX ADMIN — SODIUM CHLORIDE PRN UNIT: 9 INJECTION, SOLUTION INTRAVENOUS at 17:22

## 2020-04-21 RX ADMIN — PANTOPRAZOLE SODIUM SCH MG: 40 TABLET, DELAYED RELEASE ORAL at 06:10

## 2020-04-21 RX ADMIN — DILTIAZEM HYDROCHLORIDE SCH MG: 120 CAPSULE, EXTENDED RELEASE ORAL at 10:18

## 2020-04-21 RX ADMIN — Medication SCH EACH: at 20:33

## 2020-04-21 RX ADMIN — SODIUM CHLORIDE SCH MLS/HR: 9 INJECTION, SOLUTION INTRAVENOUS at 14:20

## 2020-04-21 RX ADMIN — DEXTROSE SCH MLS/HR: 5 SOLUTION INTRAVENOUS at 21:16

## 2020-04-21 RX ADMIN — DEXTROSE SCH MLS/HR: 50 INJECTION, SOLUTION INTRAVENOUS at 20:15

## 2020-04-21 RX ADMIN — SODIUM CHLORIDE PRN UNIT: 9 INJECTION, SOLUTION INTRAVENOUS at 11:58

## 2020-04-21 RX ADMIN — DEXAMETHASONE SODIUM PHOSPHATE SCH MG: 4 INJECTION, SOLUTION INTRAMUSCULAR; INTRAVENOUS at 20:42

## 2020-04-21 RX ADMIN — AMLODIPINE BESYLATE SCH MG: 5 TABLET ORAL at 10:20

## 2020-04-21 NOTE — NUR
PATIENT ALERT ORIENTED, NO SOB NO CHEST PAIN, TELE MONITOR SINUS RHYTHM. PATIENT HAD 2BM 
TODAY. PATIENT KEPT CLEAN AND DRY, VOIDING FREELY, V/S STABLE. CONT TO MONITOR.

## 2020-04-21 NOTE — NUR
DR QUAN ORDERED LUNG MASS BIOPSY TO BE DONE IN THE MORNING. PT IS REFUSING TO SIGN. CALLED 
AND SPOKE WITH KAREN THAT SHE CAN TALK TO HER FATHER.

## 2020-04-21 NOTE — NUR
SPOKEN TO DR ONTIVEROS CONCERNING PT'S BLOOD PRESSURE MEDICATION PARAMETERS. ORDERED TO HOLD IT 
IF SBP IS BELOW 100.

## 2020-04-22 VITALS — SYSTOLIC BLOOD PRESSURE: 119 MMHG | DIASTOLIC BLOOD PRESSURE: 47 MMHG

## 2020-04-22 VITALS — DIASTOLIC BLOOD PRESSURE: 51 MMHG | SYSTOLIC BLOOD PRESSURE: 120 MMHG

## 2020-04-22 VITALS — DIASTOLIC BLOOD PRESSURE: 51 MMHG | SYSTOLIC BLOOD PRESSURE: 131 MMHG

## 2020-04-22 VITALS — SYSTOLIC BLOOD PRESSURE: 120 MMHG | DIASTOLIC BLOOD PRESSURE: 46 MMHG

## 2020-04-22 VITALS — SYSTOLIC BLOOD PRESSURE: 130 MMHG | DIASTOLIC BLOOD PRESSURE: 56 MMHG

## 2020-04-22 LAB
ALP SERPL-CCNC: 54 U/L (ref 50–136)
ALT SERPL W/O P-5'-P-CCNC: 15 U/L (ref 16–63)
AST SERPL-CCNC: 13 U/L (ref 15–37)
BASOPHILS # BLD AUTO: 0 K/UL (ref 0–8)
BASOPHILS NFR BLD AUTO: 0.1 % (ref 0–2)
BILIRUB SERPL-MCNC: 0.4 MG/DL (ref 0.2–1)
BUN SERPL-MCNC: 24 MG/DL (ref 7–18)
CHLORIDE SERPL-SCNC: 95 MMOL/L (ref 98–107)
CO2 SERPL-SCNC: 28 MMOL/L (ref 21–32)
CREAT SERPL-MCNC: 0.8 MG/DL (ref 0.6–1.3)
EOSINOPHIL # BLD AUTO: 0 K/UL (ref 0–0.7)
EOSINOPHIL NFR BLD AUTO: 0 % (ref 0–7)
GLUCOSE SERPL-MCNC: 229 MG/DL (ref 74–106)
HCT VFR BLD AUTO: 29.4 % (ref 36.7–47.1)
HGB BLD-MCNC: 9.8 G/DL (ref 12.5–16.3)
LYMPHOCYTES # BLD AUTO: 0.5 K/UL (ref 20–40)
LYMPHOCYTES NFR BLD AUTO: 3 % (ref 20.5–51.5)
MCH RBC QN AUTO: 26 UUG (ref 23.8–33.4)
MCHC RBC AUTO-ENTMCNC: 33 G/DL (ref 32.5–36.3)
MCV RBC AUTO: 78.3 FL (ref 73–96.2)
MONOCYTES # BLD AUTO: 0.4 K/UL (ref 2–10)
MONOCYTES NFR BLD AUTO: 2.2 % (ref 0–11)
NEUTROPHILS # BLD AUTO: 16.7 K/UL (ref 1.8–8.9)
NEUTROPHILS NFR BLD AUTO: 94.7 % (ref 38.5–71.5)
PLATELET # BLD AUTO: 272 K/UL (ref 152–348)
POTASSIUM SERPL-SCNC: 4.6 MMOL/L (ref 3.5–5.1)
RBC # BLD AUTO: 3.75 MIL/UL (ref 4.06–5.63)
WBC # BLD AUTO: 17.6 K/UL (ref 3.6–10.2)
WS STN SPEC: 6.7 G/DL (ref 6.4–8.2)

## 2020-04-22 RX ADMIN — DEXTROSE SCH MLS/HR: 5 SOLUTION INTRAVENOUS at 22:06

## 2020-04-22 RX ADMIN — SODIUM CHLORIDE PRN UNIT: 9 INJECTION, SOLUTION INTRAVENOUS at 14:17

## 2020-04-22 RX ADMIN — ACETAMINOPHEN PRN MG: 325 TABLET ORAL at 23:07

## 2020-04-22 RX ADMIN — SODIUM CHLORIDE PRN UNIT: 9 INJECTION, SOLUTION INTRAVENOUS at 21:32

## 2020-04-22 RX ADMIN — PANTOPRAZOLE SODIUM SCH MG: 40 TABLET, DELAYED RELEASE ORAL at 06:10

## 2020-04-22 RX ADMIN — DILTIAZEM HYDROCHLORIDE SCH MG: 120 CAPSULE, EXTENDED RELEASE ORAL at 09:00

## 2020-04-22 RX ADMIN — Medication SCH EACH: at 07:31

## 2020-04-22 RX ADMIN — SODIUM CHLORIDE SCH MLS/HR: 9 INJECTION, SOLUTION INTRAVENOUS at 14:18

## 2020-04-22 RX ADMIN — DEXAMETHASONE SODIUM PHOSPHATE SCH MG: 4 INJECTION, SOLUTION INTRAMUSCULAR; INTRAVENOUS at 09:51

## 2020-04-22 RX ADMIN — DEXTROSE SCH MLS/HR: 50 INJECTION, SOLUTION INTRAVENOUS at 21:01

## 2020-04-22 RX ADMIN — Medication SCH EACH: at 21:25

## 2020-04-22 RX ADMIN — FLUTICASONE FUROATE AND VILANTEROL TRIFENATATE SCH EACH: 100; 25 POWDER RESPIRATORY (INHALATION) at 09:51

## 2020-04-22 RX ADMIN — SODIUM CHLORIDE PRN UNIT: 9 INJECTION, SOLUTION INTRAVENOUS at 16:58

## 2020-04-22 RX ADMIN — AMLODIPINE BESYLATE SCH MG: 5 TABLET ORAL at 16:46

## 2020-04-22 RX ADMIN — AMLODIPINE BESYLATE SCH MG: 5 TABLET ORAL at 09:00

## 2020-04-22 RX ADMIN — DEXAMETHASONE SODIUM PHOSPHATE SCH MG: 4 INJECTION, SOLUTION INTRAMUSCULAR; INTRAVENOUS at 21:02

## 2020-04-22 RX ADMIN — LOSARTAN POTASSIUM SCH MG: 25 TABLET, FILM COATED ORAL at 21:02

## 2020-04-22 RX ADMIN — Medication SCH ML: at 18:05

## 2020-04-22 RX ADMIN — Medication SCH EACH: at 16:45

## 2020-04-22 RX ADMIN — Medication SCH EACH: at 12:03

## 2020-04-22 RX ADMIN — LOSARTAN POTASSIUM SCH MG: 25 TABLET, FILM COATED ORAL at 09:00

## 2020-04-22 NOTE — NUR
PATIENT SLEEPING INTERMITTENTLY THROUGH THE SHIFT. NSR ON TELE MONITOR. V/S STABLE. NO SIGNS 
OF ACUTE DISTRESS. DENIES SOB AND CHEST PAIN. WILL CONTINUE TO MONITOR.

## 2020-04-22 NOTE — NUR
SS CONSULTATION requested by Nursing Director Cyndee Cee for clarification on 
patient's healthcare wishes.  



1:20pm:  SW met with this patient, who was awake and in his hospital bed having lunch.  
Patient welcomed SW into his hospital room and was receptive to speaking with this SW.  SW 
assessed patient's level of orientation, and patient was oriented x 3.  SW generated a 
discussion about patient's healthcare wishes, and provided patient with education on the 
different intensities of care that people may choose for their health care needs.  Patient 
was engaged in the conversation, although on a couple of occasions SW had to repeat and 
re-word the information provided in order for the patient to express understanding.  SW 
clarified patient's code status, which is currently a DNR, but patient expressed being 
unsure of this status.  SW asked the patient "if your heart stops, do you want the nurses to 
re-start it" and patient's response was "um..maybe not"?   SW asked the same question to the 
patient x 3 throughout conversation with the patient, and all 3 times patient's response was 
"um...maybe not"?  Patient informed this SW that he will be having a lung biopsy tomorrow, 
and that he is in agreement with having this procedure.   SW thanked patient for his time, 
and also asked the patient if patient would be ok with SW contacting his daughter Mary Rojas to discuss and coordinate patient's care need.  Patient expressed agreement and 
provided verbal authorization for SW to contact patient's daughter.  SW then spoke with 
Nursing Director Cyndee Cee and informed her of above conversation SW had with 
the patient, and SW's concern that although patient is oriented, he may need further 
information/education pertaining to healthcare wishes in order for the patient to make an 
informed decision on his code status.  Cyndee expressed agreement, and stated that she 
would speak with the patient's physician regarding this matter, and that Cyndee and this 
SW would also contact the patient's daughter tomorrow to speak with her.

## 2020-04-22 NOTE — NUR
talked to Daniel in radiology regarding pt consenting for right lung mass biopsy. He will 
call back for schedule. Dr. Em and Dr. Christian made aware of patient's decision.

## 2020-04-22 NOTE — NUR
PATIENT ALERT ORIENTED, NO SOB NO CHEST PAIN, TELE MONITOR SINUS RHYTHM, KEPT CLEAN AND DRY, 
USE URINAL FOR BLADDER ELIMINATIONS. CONT TO MONITOR.

## 2020-04-23 VITALS — DIASTOLIC BLOOD PRESSURE: 50 MMHG | SYSTOLIC BLOOD PRESSURE: 123 MMHG

## 2020-04-23 VITALS — SYSTOLIC BLOOD PRESSURE: 122 MMHG | DIASTOLIC BLOOD PRESSURE: 83 MMHG

## 2020-04-23 VITALS — SYSTOLIC BLOOD PRESSURE: 118 MMHG | DIASTOLIC BLOOD PRESSURE: 64 MMHG

## 2020-04-23 VITALS — DIASTOLIC BLOOD PRESSURE: 54 MMHG | SYSTOLIC BLOOD PRESSURE: 121 MMHG

## 2020-04-23 VITALS — DIASTOLIC BLOOD PRESSURE: 50 MMHG | SYSTOLIC BLOOD PRESSURE: 132 MMHG

## 2020-04-23 VITALS — SYSTOLIC BLOOD PRESSURE: 127 MMHG | DIASTOLIC BLOOD PRESSURE: 52 MMHG

## 2020-04-23 VITALS — SYSTOLIC BLOOD PRESSURE: 134 MMHG | DIASTOLIC BLOOD PRESSURE: 54 MMHG

## 2020-04-23 LAB
BUN SERPL-MCNC: 23 MG/DL (ref 7–18)
CHLORIDE SERPL-SCNC: 95 MMOL/L (ref 98–107)
CO2 SERPL-SCNC: 34 MMOL/L (ref 21–32)
CREAT SERPL-MCNC: 1 MG/DL (ref 0.6–1.3)
GLUCOSE SERPL-MCNC: 215 MG/DL (ref 74–106)
POTASSIUM SERPL-SCNC: 5.3 MMOL/L (ref 3.5–5.1)
TSH SERPL DL<=0.005 MIU/L-ACNC: 0.12 MIU/ML (ref 0.36–3.74)
URATE SERPL-MCNC: 2.3 MG/DL (ref 3.5–7.2)

## 2020-04-23 PROCEDURE — 0WB83ZX EXCISION OF CHEST WALL, PERCUTANEOUS APPROACH, DIAGNOSTIC: ICD-10-PCS

## 2020-04-23 PROCEDURE — 0BDC4ZX EXTRACTION OF RIGHT UPPER LUNG LOBE, PERCUTANEOUS ENDOSCOPIC APPROACH, DIAGNOSTIC: ICD-10-PCS

## 2020-04-23 RX ADMIN — DEXTROSE SCH MLS/HR: 50 INJECTION, SOLUTION INTRAVENOUS at 21:01

## 2020-04-23 RX ADMIN — SODIUM CHLORIDE SCH MLS/HR: 9 INJECTION, SOLUTION INTRAVENOUS at 13:21

## 2020-04-23 RX ADMIN — Medication SCH ML: at 08:00

## 2020-04-23 RX ADMIN — Medication SCH EACH: at 11:47

## 2020-04-23 RX ADMIN — DILTIAZEM HYDROCHLORIDE SCH MG: 120 CAPSULE, EXTENDED RELEASE ORAL at 09:51

## 2020-04-23 RX ADMIN — SODIUM CHLORIDE PRN UNIT: 9 INJECTION, SOLUTION INTRAVENOUS at 11:29

## 2020-04-23 RX ADMIN — LOSARTAN POTASSIUM SCH MG: 25 TABLET, FILM COATED ORAL at 09:52

## 2020-04-23 RX ADMIN — AMLODIPINE BESYLATE SCH MG: 5 TABLET ORAL at 09:52

## 2020-04-23 RX ADMIN — ALBUTEROL SULFATE PRN MG: 2.5 SOLUTION RESPIRATORY (INHALATION) at 07:30

## 2020-04-23 RX ADMIN — DEXTROSE SCH MLS/HR: 5 SOLUTION INTRAVENOUS at 22:14

## 2020-04-23 RX ADMIN — Medication SCH EACH: at 06:22

## 2020-04-23 RX ADMIN — AMLODIPINE BESYLATE SCH MG: 5 TABLET ORAL at 16:40

## 2020-04-23 RX ADMIN — DEXAMETHASONE SODIUM PHOSPHATE SCH MG: 4 INJECTION, SOLUTION INTRAMUSCULAR; INTRAVENOUS at 21:19

## 2020-04-23 RX ADMIN — MAGNESIUM HYDROXIDE PRN ML: 400 SUSPENSION ORAL at 11:46

## 2020-04-23 RX ADMIN — Medication SCH EACH: at 16:34

## 2020-04-23 RX ADMIN — Medication SCH ML: at 17:17

## 2020-04-23 RX ADMIN — SODIUM CHLORIDE PRN UNIT: 9 INJECTION, SOLUTION INTRAVENOUS at 16:38

## 2020-04-23 RX ADMIN — IPRATROPIUM BROMIDE PRN MG: 0.5 SOLUTION RESPIRATORY (INHALATION) at 07:30

## 2020-04-23 RX ADMIN — LOSARTAN POTASSIUM SCH MG: 25 TABLET, FILM COATED ORAL at 21:19

## 2020-04-23 RX ADMIN — Medication SCH EACH: at 21:18

## 2020-04-23 RX ADMIN — FLUTICASONE FUROATE AND VILANTEROL TRIFENATATE SCH EACH: 100; 25 POWDER RESPIRATORY (INHALATION) at 09:53

## 2020-04-23 RX ADMIN — DEXAMETHASONE SODIUM PHOSPHATE SCH MG: 4 INJECTION, SOLUTION INTRAMUSCULAR; INTRAVENOUS at 09:51

## 2020-04-23 RX ADMIN — PANTOPRAZOLE SODIUM SCH MG: 40 TABLET, DELAYED RELEASE ORAL at 06:14

## 2020-04-23 RX ADMIN — SODIUM CHLORIDE PRN UNIT: 9 INJECTION, SOLUTION INTRAVENOUS at 21:37

## 2020-04-23 NOTE — NUR
Patient resting in bed. Patient denies SOB or respiratory distress. Patient denies pain. 
Vital signs stable. Attended to his needs. Safety measures in place. Bed in lowest position, 
bed alarm on. Call lights within reach. Will endorse to the oncoming RN for the continuity 
of care.

## 2020-04-23 NOTE — NUR
Patient brought down to surgery with radiologist. Full telephone SBAR Report given to CORBY Best from Surgery. Patient is stable and NAD upon leaving the floor.

## 2020-04-23 NOTE — NUR
PATIENT CONTINUE ON NPO, ALERT ORIENTED, NO SOB NO CHEST PAIN, TELE MONITOR SINUS RHYTHM, 
VOIDING FREELY, V/S STABLE.

## 2020-04-23 NOTE — NUR
SAM spoke with Nursing Director Cyndee today, along with TRISTA Raymond, and patient's physician 
Dr. Em.  It was agreed to schedule a phone conference with the family to further discuss 
patient's care and healthcare choices.  Cyndee spoke with patient's daughter Mary 
[phone] and schedule a phone meeting for tomorrow 04/24 at 10am.  Dr. Em informed of 
date/time.

## 2020-04-23 NOTE — NUR
Orienting with CORBY Palacio today. All charting/documentation and assessment notes reviewed by 
CORBY Palacio.

## 2020-04-23 NOTE — NUR
Pt received from surgery s/p right lung biopsy. VSS wnl. Pt stable and nad noted upon 
returning to the floor. Full SBAR report received by surgery RN.

## 2020-04-24 ENCOUNTER — HOSPITAL ENCOUNTER (INPATIENT)
Dept: HOSPITAL 12 - REHABOV3 | Age: 75
LOS: 6 days | Discharge: ANOTHER HOSPITAL | DRG: 58 | End: 2020-04-30
Attending: PHYSICAL MEDICINE & REHABILITATION | Admitting: PHYSICAL MEDICINE & REHABILITATION
Payer: MEDICAID

## 2020-04-24 VITALS — DIASTOLIC BLOOD PRESSURE: 51 MMHG | SYSTOLIC BLOOD PRESSURE: 130 MMHG

## 2020-04-24 VITALS — HEIGHT: 66 IN | BODY MASS INDEX: 24.11 KG/M2 | WEIGHT: 150 LBS

## 2020-04-24 VITALS — SYSTOLIC BLOOD PRESSURE: 133 MMHG | DIASTOLIC BLOOD PRESSURE: 51 MMHG

## 2020-04-24 VITALS — SYSTOLIC BLOOD PRESSURE: 104 MMHG | DIASTOLIC BLOOD PRESSURE: 56 MMHG

## 2020-04-24 VITALS — SYSTOLIC BLOOD PRESSURE: 131 MMHG | DIASTOLIC BLOOD PRESSURE: 50 MMHG

## 2020-04-24 VITALS — SYSTOLIC BLOOD PRESSURE: 134 MMHG | DIASTOLIC BLOOD PRESSURE: 44 MMHG

## 2020-04-24 VITALS — SYSTOLIC BLOOD PRESSURE: 130 MMHG | DIASTOLIC BLOOD PRESSURE: 50 MMHG

## 2020-04-24 DIAGNOSIS — I69.198: Primary | ICD-10-CM

## 2020-04-24 DIAGNOSIS — D50.0: ICD-10-CM

## 2020-04-24 DIAGNOSIS — G31.84: ICD-10-CM

## 2020-04-24 DIAGNOSIS — J18.9: ICD-10-CM

## 2020-04-24 DIAGNOSIS — Z85.038: ICD-10-CM

## 2020-04-24 DIAGNOSIS — E11.65: ICD-10-CM

## 2020-04-24 DIAGNOSIS — I48.91: ICD-10-CM

## 2020-04-24 DIAGNOSIS — R19.5: ICD-10-CM

## 2020-04-24 DIAGNOSIS — Z92.21: ICD-10-CM

## 2020-04-24 DIAGNOSIS — Z90.49: ICD-10-CM

## 2020-04-24 DIAGNOSIS — I50.32: ICD-10-CM

## 2020-04-24 DIAGNOSIS — Z66: ICD-10-CM

## 2020-04-24 DIAGNOSIS — C79.89: ICD-10-CM

## 2020-04-24 DIAGNOSIS — Z87.891: ICD-10-CM

## 2020-04-24 DIAGNOSIS — Z85.6: ICD-10-CM

## 2020-04-24 DIAGNOSIS — G92: ICD-10-CM

## 2020-04-24 DIAGNOSIS — I25.10: ICD-10-CM

## 2020-04-24 DIAGNOSIS — E22.2: ICD-10-CM

## 2020-04-24 DIAGNOSIS — I70.0: ICD-10-CM

## 2020-04-24 DIAGNOSIS — E44.0: ICD-10-CM

## 2020-04-24 DIAGNOSIS — D68.59: ICD-10-CM

## 2020-04-24 DIAGNOSIS — K59.00: ICD-10-CM

## 2020-04-24 DIAGNOSIS — E87.5: ICD-10-CM

## 2020-04-24 DIAGNOSIS — C34.11: ICD-10-CM

## 2020-04-24 DIAGNOSIS — C79.31: ICD-10-CM

## 2020-04-24 DIAGNOSIS — G93.6: ICD-10-CM

## 2020-04-24 DIAGNOSIS — I11.0: ICD-10-CM

## 2020-04-24 DIAGNOSIS — E78.5: ICD-10-CM

## 2020-04-24 DIAGNOSIS — A41.9: ICD-10-CM

## 2020-04-24 DIAGNOSIS — J44.0: ICD-10-CM

## 2020-04-24 DIAGNOSIS — K43.9: ICD-10-CM

## 2020-04-24 LAB
ALP SERPL-CCNC: 57 U/L (ref 50–136)
ALT SERPL W/O P-5'-P-CCNC: 19 U/L (ref 16–63)
AST SERPL-CCNC: 15 U/L (ref 15–37)
BASOPHILS # BLD AUTO: 0 K/UL (ref 0–8)
BASOPHILS NFR BLD AUTO: 0.1 % (ref 0–2)
BILIRUB SERPL-MCNC: 0.3 MG/DL (ref 0.2–1)
BUN SERPL-MCNC: 21 MG/DL (ref 7–18)
CHLORIDE SERPL-SCNC: 94 MMOL/L (ref 98–107)
CO2 SERPL-SCNC: 33 MMOL/L (ref 21–32)
CREAT SERPL-MCNC: 1 MG/DL (ref 0.6–1.3)
EOSINOPHIL # BLD AUTO: 0 K/UL (ref 0–0.7)
EOSINOPHIL NFR BLD AUTO: 0 % (ref 0–7)
GLUCOSE SERPL-MCNC: 223 MG/DL (ref 74–106)
HCT VFR BLD AUTO: 31.1 % (ref 36.7–47.1)
HGB BLD-MCNC: 10.3 G/DL (ref 12.5–16.3)
LYMPHOCYTES # BLD AUTO: 0.6 K/UL (ref 20–40)
LYMPHOCYTES NFR BLD AUTO: 4.6 % (ref 20.5–51.5)
MAGNESIUM SERPL-MCNC: 2.1 MG/DL (ref 1.8–2.4)
MCH RBC QN AUTO: 26.2 UUG (ref 23.8–33.4)
MCHC RBC AUTO-ENTMCNC: 33 G/DL (ref 32.5–36.3)
MCV RBC AUTO: 79.1 FL (ref 73–96.2)
MONOCYTES # BLD AUTO: 0.6 K/UL (ref 2–10)
MONOCYTES NFR BLD AUTO: 4.3 % (ref 0–11)
NEUTROPHILS # BLD AUTO: 12.5 K/UL (ref 1.8–8.9)
NEUTROPHILS NFR BLD AUTO: 91 % (ref 38.5–71.5)
PHOSPHATE SERPL-MCNC: 3.7 MG/DL (ref 2.5–4.9)
PLATELET # BLD AUTO: 321 K/UL (ref 152–348)
POTASSIUM SERPL-SCNC: 4.5 MMOL/L (ref 3.5–5.1)
RBC # BLD AUTO: 3.94 MIL/UL (ref 4.06–5.63)
WBC # BLD AUTO: 13.7 K/UL (ref 3.6–10.2)
WS STN SPEC: 6.3 G/DL (ref 6.4–8.2)

## 2020-04-24 PROCEDURE — A4217 STERILE WATER/SALINE, 500 ML: HCPCS

## 2020-04-24 RX ADMIN — DEXTROSE SCH MLS/HR: 5 SOLUTION INTRAVENOUS at 21:08

## 2020-04-24 RX ADMIN — DEXAMETHASONE SODIUM PHOSPHATE SCH MG: 4 INJECTION, SOLUTION INTRAMUSCULAR; INTRAVENOUS at 08:06

## 2020-04-24 RX ADMIN — Medication SCH EACH: at 20:37

## 2020-04-24 RX ADMIN — INSULIN GLARGINE SCH UNITS: 100 INJECTION, SOLUTION SUBCUTANEOUS at 20:42

## 2020-04-24 RX ADMIN — SODIUM CHLORIDE SCH MLS/HR: 9 INJECTION, SOLUTION INTRAVENOUS at 14:16

## 2020-04-24 RX ADMIN — Medication SCH ML: at 17:34

## 2020-04-24 RX ADMIN — Medication SCH EACH: at 06:37

## 2020-04-24 RX ADMIN — SODIUM CHLORIDE PRN UNIT: 9 INJECTION, SOLUTION INTRAVENOUS at 17:56

## 2020-04-24 RX ADMIN — PANTOPRAZOLE SODIUM SCH MG: 40 TABLET, DELAYED RELEASE ORAL at 06:05

## 2020-04-24 RX ADMIN — SODIUM CHLORIDE PRN UNIT: 9 INJECTION, SOLUTION INTRAVENOUS at 08:03

## 2020-04-24 RX ADMIN — DEXAMETHASONE SODIUM PHOSPHATE SCH MG: 4 INJECTION, SOLUTION INTRAMUSCULAR; INTRAVENOUS at 20:34

## 2020-04-24 RX ADMIN — LOSARTAN POTASSIUM SCH MG: 25 TABLET, FILM COATED ORAL at 08:07

## 2020-04-24 RX ADMIN — AMLODIPINE BESYLATE SCH MG: 5 TABLET ORAL at 08:06

## 2020-04-24 RX ADMIN — Medication SCH EACH: at 08:01

## 2020-04-24 RX ADMIN — LOSARTAN POTASSIUM SCH MG: 25 TABLET, FILM COATED ORAL at 21:08

## 2020-04-24 RX ADMIN — SODIUM CHLORIDE PRN UNIT: 9 INJECTION, SOLUTION INTRAVENOUS at 20:40

## 2020-04-24 RX ADMIN — DEXTROSE SCH MLS/HR: 50 INJECTION, SOLUTION INTRAVENOUS at 20:34

## 2020-04-24 RX ADMIN — FLUTICASONE FUROATE AND VILANTEROL TRIFENATATE SCH EACH: 100; 25 POWDER RESPIRATORY (INHALATION) at 08:05

## 2020-04-24 RX ADMIN — Medication SCH ML: at 08:42

## 2020-04-24 RX ADMIN — DILTIAZEM HYDROCHLORIDE SCH MG: 120 CAPSULE, EXTENDED RELEASE ORAL at 08:06

## 2020-04-24 RX ADMIN — Medication SCH EACH: at 11:44

## 2020-04-24 RX ADMIN — AMLODIPINE BESYLATE SCH MG: 5 TABLET ORAL at 17:45

## 2020-04-24 RX ADMIN — SODIUM CHLORIDE PRN UNIT: 9 INJECTION, SOLUTION INTRAVENOUS at 11:48

## 2020-04-24 NOTE — NUR
PATIENT DISCHARGED IN STABLE CONDITION WITH NO SIGNS OF DISTRESS; PATIENT WILL BE ADMITTED 
TO ARU FOR REHABILITATION SERVICES ; PATIENT WITH STABLE VITAL SIGNS. PATIENT AT BASELINE 
MENTAL STATUS.

## 2020-04-24 NOTE — NUR
Patient's vitals are stable. No signs of respiratory distress and sob. Patient is on 2L NC.  
Denies any pain. Patient was D/C to [facility] and re admitted to ARU for rehabilitation care.

## 2020-04-24 NOTE — NUR
Patient resting in bed, awake and alert. Vitals are stable, patient on 2L NC setting at 99%. 
Denies any SOB/ respiratory distress, denies any pain. Safety measures in place, bed in low 
position, call lights within reach. Will endorse to the oncoming night shift RN for the 
continuity of care.

## 2020-04-24 NOTE — NUR
10:05am:  Phone conference held today with patient's daughter Mary Saldaña 
[phone] and granddaughter Mihai Mendez [phone] (granddaughter was present in the 
room during the meeting).  Present at this meeting were this SAM Dorman, Nursing Director 
Cyndee Leon, and patient's physicians Dr. Manav Campos.  Dr. Em reviewed 
patient's medical condition, and reported that some tests results were still pending.  
Patient's ability to make his own healthcare decisions were discussed, and patient's 
granddaughter and daughter both expressed agreement that patient is currently oriented 
enough to understand and make his own decisions, and they would like to support him on his 
choices.  The importance of providing patient with the necessary information about his 
medical condition and options for treatment were discussed, which would allow patient to 
make an informed decision, and patient's family expressed understanding and agreement.  
Patient's family asked about becoming POA for the patient, and SAM provided information on 
the process of how patient can complete a healthcare directive and assign agents as decision 
makers.  SAM also provided patient's granddaughter Mihai with a healthcare directive form 
and reviewed the form with her, explaining how to complete the form.  Mihai expressed 
understanding.  Patient's code status decision of DNR was discussed and then confirmed 
afterwards with the patient.  Patient's current code status will be DNR.  Family expressed 
concern about not being able to get information from nursing staff when requesting updates 
on patient's condition.  SAM suggested that verbal consent will be obtained from patient 
verifying who patient wants to allow his information to be released to, and then patient 
will complete and sign an Authorization for use or disclosure of health information form, 
identifying the individual(s) that he has consented to release information to.  Patient's 
daughter and granddaughter expressed agreement.  Nursing Director Cyndee to follow-up with 
patient in order to obtain this authorization.  Patient's family expressed appreciation for 
the information provided and discussed.  Patient's family expressed agreement with all that 
was discussed and with patient's ongoing treatment plan. Addended by: KEHR, KRISTEN on: 8/8/2017 01:56 PM     Modules accepted: Orders

## 2020-04-24 NOTE — NUR
Patient slept well. No SOB noted. No complaints of pain. SR on Tele monitor. All needs 
attended. Will endorse accordingly

## 2020-04-25 VITALS — DIASTOLIC BLOOD PRESSURE: 55 MMHG | SYSTOLIC BLOOD PRESSURE: 139 MMHG

## 2020-04-25 VITALS — DIASTOLIC BLOOD PRESSURE: 58 MMHG | SYSTOLIC BLOOD PRESSURE: 139 MMHG

## 2020-04-25 VITALS — DIASTOLIC BLOOD PRESSURE: 69 MMHG | SYSTOLIC BLOOD PRESSURE: 115 MMHG

## 2020-04-25 VITALS — DIASTOLIC BLOOD PRESSURE: 60 MMHG | SYSTOLIC BLOOD PRESSURE: 139 MMHG

## 2020-04-25 LAB
ALP SERPL-CCNC: 58 U/L (ref 50–136)
ALT SERPL W/O P-5'-P-CCNC: 18 U/L (ref 16–63)
AST SERPL-CCNC: 13 U/L (ref 15–37)
BASOPHILS # BLD AUTO: 0 K/UL (ref 0–8)
BASOPHILS NFR BLD AUTO: 0 % (ref 0–2)
BILIRUB SERPL-MCNC: 0.5 MG/DL (ref 0.2–1)
BUN SERPL-MCNC: 24 MG/DL (ref 7–18)
CHLORIDE SERPL-SCNC: 94 MMOL/L (ref 98–107)
CO2 SERPL-SCNC: 33 MMOL/L (ref 21–32)
CREAT SERPL-MCNC: 0.9 MG/DL (ref 0.6–1.3)
EOSINOPHIL # BLD AUTO: 0 K/UL (ref 0–0.7)
EOSINOPHIL NFR BLD AUTO: 0 % (ref 0–7)
FERRITIN SERPL-MCNC: 1225 NG/ML (ref 26–388)
GLUCOSE SERPL-MCNC: 137 MG/DL (ref 74–106)
HCT VFR BLD AUTO: 33.7 % (ref 36.7–47.1)
HGB BLD-MCNC: 10.9 G/DL (ref 12.5–16.3)
IRON SERPL-MCNC: 90 UG/DL (ref 50–175)
LYMPHOCYTES # BLD AUTO: 0.6 K/UL (ref 20–40)
LYMPHOCYTES NFR BLD AUTO: 4.3 % (ref 20.5–51.5)
MCH RBC QN AUTO: 26.1 UUG (ref 23.8–33.4)
MCHC RBC AUTO-ENTMCNC: 32 G/DL (ref 32.5–36.3)
MCV RBC AUTO: 80.3 FL (ref 73–96.2)
MONOCYTES # BLD AUTO: 0.5 K/UL (ref 2–10)
MONOCYTES NFR BLD AUTO: 3.9 % (ref 0–11)
NEUTROPHILS # BLD AUTO: 12.9 K/UL (ref 1.8–8.9)
NEUTROPHILS NFR BLD AUTO: 91.8 % (ref 38.5–71.5)
PLATELET # BLD AUTO: 298 K/UL (ref 152–348)
POTASSIUM SERPL-SCNC: 4.3 MMOL/L (ref 3.5–5.1)
RBC # BLD AUTO: 4.2 MIL/UL (ref 4.06–5.63)
WBC # BLD AUTO: 14 K/UL (ref 3.6–10.2)
WS STN SPEC: 6.5 G/DL (ref 6.4–8.2)

## 2020-04-25 RX ADMIN — INSULIN GLARGINE SCH UNITS: 100 INJECTION, SOLUTION SUBCUTANEOUS at 21:22

## 2020-04-25 RX ADMIN — DEXAMETHASONE SODIUM PHOSPHATE SCH MG: 4 INJECTION, SOLUTION INTRAMUSCULAR; INTRAVENOUS at 20:41

## 2020-04-25 RX ADMIN — FERROUS SULFATE TAB 325 MG (65 MG ELEMENTAL FE) SCH MG: 325 (65 FE) TAB at 08:21

## 2020-04-25 RX ADMIN — SODIUM CHLORIDE PRN UNIT: 9 INJECTION, SOLUTION INTRAVENOUS at 12:02

## 2020-04-25 RX ADMIN — DEXTROSE SCH MLS/HR: 5 SOLUTION INTRAVENOUS at 21:24

## 2020-04-25 RX ADMIN — DILTIAZEM HYDROCHLORIDE SCH MG: 120 CAPSULE, EXTENDED RELEASE ORAL at 08:22

## 2020-04-25 RX ADMIN — FLUTICASONE FUROATE AND VILANTEROL TRIFENATATE SCH EACH: 100; 25 POWDER RESPIRATORY (INHALATION) at 08:24

## 2020-04-25 RX ADMIN — LOSARTAN POTASSIUM SCH MG: 25 TABLET, FILM COATED ORAL at 08:23

## 2020-04-25 RX ADMIN — Medication SCH ML: at 17:17

## 2020-04-25 RX ADMIN — ACETAMINOPHEN PRN MG: 325 TABLET ORAL at 08:21

## 2020-04-25 RX ADMIN — PANTOPRAZOLE SODIUM SCH MG: 40 TABLET, DELAYED RELEASE ORAL at 08:21

## 2020-04-25 RX ADMIN — AMLODIPINE BESYLATE SCH MG: 5 TABLET ORAL at 08:23

## 2020-04-25 RX ADMIN — Medication SCH EACH: at 21:17

## 2020-04-25 RX ADMIN — DEXTROSE SCH MLS/HR: 50 INJECTION, SOLUTION INTRAVENOUS at 20:41

## 2020-04-25 RX ADMIN — LOSARTAN POTASSIUM SCH MG: 25 TABLET, FILM COATED ORAL at 20:42

## 2020-04-25 RX ADMIN — SODIUM CHLORIDE PRN UNIT: 9 INJECTION, SOLUTION INTRAVENOUS at 17:25

## 2020-04-25 RX ADMIN — FERROUS SULFATE TAB 325 MG (65 MG ELEMENTAL FE) SCH MG: 325 (65 FE) TAB at 20:42

## 2020-04-25 RX ADMIN — Medication SCH EACH: at 12:00

## 2020-04-25 RX ADMIN — Medication SCH EACH: at 06:34

## 2020-04-25 RX ADMIN — DEXAMETHASONE SODIUM PHOSPHATE SCH MG: 4 INJECTION, SOLUTION INTRAMUSCULAR; INTRAVENOUS at 08:23

## 2020-04-25 RX ADMIN — SODIUM CHLORIDE PRN UNIT: 9 INJECTION, SOLUTION INTRAVENOUS at 21:21

## 2020-04-25 RX ADMIN — AMLODIPINE BESYLATE SCH MG: 5 TABLET ORAL at 17:19

## 2020-04-25 RX ADMIN — Medication SCH EACH: at 17:17

## 2020-04-25 RX ADMIN — Medication SCH ML: at 08:23

## 2020-04-25 RX ADMIN — SODIUM CHLORIDE PRN UNIT: 9 INJECTION, SOLUTION INTRAVENOUS at 08:15

## 2020-04-25 NOTE — NUR
End of shift note: Quiet night. No acute distress noted. Denies any pain nor any

discomfort. VSS. On continous O2 @ 2L via nasal cannula, pulse 0x 98%. Voiding

well.

## 2020-04-25 NOTE — NUR
Patient resting in bed, complaints of dull pain 3/10 patient reports of biopsy, no signs of 
distress noted, call light in reach, bed locked and in lowest  position, all needs met at 
this time

## 2020-04-25 NOTE — NUR
awake alert and oriented x3-4  watching TV @ beginning of the shift. On O2 @ 2L via 

nasal cannula, pulse ox 98%. No respiratory distress noted. VSS. Kept comfortable. 

IV ABT's given as scheduled. No ill effects noted. Tolerated po meds well. Voiding

freely in the urinal. OOB to bedside commode with moderate assist. BM noted this 

shift. Denies any pain nor any discomfort. Accucheck @ 2100 was 251, with coverage

given. Will monitor patient. Needs attended. Call bell within reach. Siderails up for

safety.

## 2020-04-25 NOTE — NUR
watching TV upon initial rounds. aaox3-4 In good spirits. VSS No acute distress noted.

Will monitor patient. Denies any pain nor any discomfort. Due meds given as scheduled. 

Kept comfortable. Fall precautions maintained. Call bell within reach.

## 2020-04-26 VITALS — SYSTOLIC BLOOD PRESSURE: 128 MMHG | DIASTOLIC BLOOD PRESSURE: 50 MMHG

## 2020-04-26 VITALS — DIASTOLIC BLOOD PRESSURE: 57 MMHG | SYSTOLIC BLOOD PRESSURE: 132 MMHG

## 2020-04-26 VITALS — SYSTOLIC BLOOD PRESSURE: 123 MMHG | DIASTOLIC BLOOD PRESSURE: 52 MMHG

## 2020-04-26 VITALS — DIASTOLIC BLOOD PRESSURE: 43 MMHG | SYSTOLIC BLOOD PRESSURE: 119 MMHG

## 2020-04-26 RX ADMIN — FLUTICASONE FUROATE AND VILANTEROL TRIFENATATE SCH EACH: 100; 25 POWDER RESPIRATORY (INHALATION) at 08:46

## 2020-04-26 RX ADMIN — DEXTROSE SCH MLS/HR: 50 INJECTION, SOLUTION INTRAVENOUS at 19:47

## 2020-04-26 RX ADMIN — Medication SCH EACH: at 20:03

## 2020-04-26 RX ADMIN — DEXTROSE SCH MLS/HR: 5 SOLUTION INTRAVENOUS at 21:07

## 2020-04-26 RX ADMIN — AMLODIPINE BESYLATE SCH MG: 5 TABLET ORAL at 16:37

## 2020-04-26 RX ADMIN — LOSARTAN POTASSIUM SCH MG: 25 TABLET, FILM COATED ORAL at 08:45

## 2020-04-26 RX ADMIN — FERROUS SULFATE TAB 325 MG (65 MG ELEMENTAL FE) SCH MG: 325 (65 FE) TAB at 20:45

## 2020-04-26 RX ADMIN — SODIUM CHLORIDE PRN UNIT: 9 INJECTION, SOLUTION INTRAVENOUS at 09:27

## 2020-04-26 RX ADMIN — AMLODIPINE BESYLATE SCH MG: 5 TABLET ORAL at 08:44

## 2020-04-26 RX ADMIN — FERROUS SULFATE TAB 325 MG (65 MG ELEMENTAL FE) SCH MG: 325 (65 FE) TAB at 08:44

## 2020-04-26 RX ADMIN — PANTOPRAZOLE SODIUM SCH MG: 40 TABLET, DELAYED RELEASE ORAL at 08:44

## 2020-04-26 RX ADMIN — Medication SCH ML: at 16:36

## 2020-04-26 RX ADMIN — DEXAMETHASONE SODIUM PHOSPHATE SCH MG: 4 INJECTION, SOLUTION INTRAMUSCULAR; INTRAVENOUS at 20:47

## 2020-04-26 RX ADMIN — DEXAMETHASONE SODIUM PHOSPHATE SCH MG: 4 INJECTION, SOLUTION INTRAMUSCULAR; INTRAVENOUS at 08:45

## 2020-04-26 RX ADMIN — DILTIAZEM HYDROCHLORIDE SCH MG: 120 CAPSULE, EXTENDED RELEASE ORAL at 08:45

## 2020-04-26 RX ADMIN — INSULIN GLARGINE SCH UNITS: 100 INJECTION, SOLUTION SUBCUTANEOUS at 20:45

## 2020-04-26 RX ADMIN — Medication SCH EACH: at 16:36

## 2020-04-26 RX ADMIN — SODIUM CHLORIDE PRN UNIT: 9 INJECTION, SOLUTION INTRAVENOUS at 20:44

## 2020-04-26 RX ADMIN — Medication SCH ML: at 08:50

## 2020-04-26 RX ADMIN — SODIUM CHLORIDE PRN UNIT: 9 INJECTION, SOLUTION INTRAVENOUS at 16:40

## 2020-04-26 RX ADMIN — LOSARTAN POTASSIUM SCH MG: 25 TABLET, FILM COATED ORAL at 20:46

## 2020-04-26 RX ADMIN — Medication SCH EACH: at 12:11

## 2020-04-26 RX ADMIN — Medication SCH EACH: at 06:34

## 2020-04-26 NOTE — NUR
End of shift note: Slept @ long intervals. Needs attended. No acute

distress noted. No complaints presented during shift. Voided. No BM 

noted.

## 2020-04-26 NOTE — NUR
Received pt sleeping comfortably. Aroused easily to verbal stimuli. Alert and oriented x3-4. 
On 2L O2 via NC, no acute distress noted. Denies pain/ discomfort. IV on right hand, patent 
and intact. Due meds given as ordered. Accucheck 199, insulin coverage given as per sliding 
scale. Snack provided. Safety measures maintained. Call light and personal items within 
reach. Will continue to monitor.

## 2020-04-27 VITALS — SYSTOLIC BLOOD PRESSURE: 147 MMHG | DIASTOLIC BLOOD PRESSURE: 49 MMHG

## 2020-04-27 VITALS — SYSTOLIC BLOOD PRESSURE: 108 MMHG | DIASTOLIC BLOOD PRESSURE: 53 MMHG

## 2020-04-27 VITALS — SYSTOLIC BLOOD PRESSURE: 145 MMHG | DIASTOLIC BLOOD PRESSURE: 55 MMHG

## 2020-04-27 VITALS — DIASTOLIC BLOOD PRESSURE: 44 MMHG | SYSTOLIC BLOOD PRESSURE: 133 MMHG

## 2020-04-27 LAB
ALP SERPL-CCNC: 55 U/L (ref 50–136)
ALT SERPL W/O P-5'-P-CCNC: 20 U/L (ref 16–63)
AST SERPL-CCNC: 12 U/L (ref 15–37)
BASOPHILS # BLD AUTO: 0 K/UL (ref 0–8)
BASOPHILS NFR BLD AUTO: 0.1 % (ref 0–2)
BILIRUB SERPL-MCNC: 0.4 MG/DL (ref 0.2–1)
BUN SERPL-MCNC: 30 MG/DL (ref 7–18)
CHLORIDE SERPL-SCNC: 95 MMOL/L (ref 98–107)
CO2 SERPL-SCNC: 34 MMOL/L (ref 21–32)
CREAT SERPL-MCNC: 0.9 MG/DL (ref 0.6–1.3)
EOSINOPHIL # BLD AUTO: 0 K/UL (ref 0–0.7)
EOSINOPHIL NFR BLD AUTO: 0 % (ref 0–7)
GLUCOSE SERPL-MCNC: 207 MG/DL (ref 74–106)
HCT VFR BLD AUTO: 34.1 % (ref 36.7–47.1)
HGB BLD-MCNC: 10.9 G/DL (ref 12.5–16.3)
LYMPHOCYTES # BLD AUTO: 0.4 K/UL (ref 20–40)
LYMPHOCYTES NFR BLD AUTO: 2.5 % (ref 20.5–51.5)
MAGNESIUM SERPL-MCNC: 2.2 MG/DL (ref 1.8–2.4)
MCH RBC QN AUTO: 25.8 UUG (ref 23.8–33.4)
MCHC RBC AUTO-ENTMCNC: 32 G/DL (ref 32.5–36.3)
MCV RBC AUTO: 80.9 FL (ref 73–96.2)
MONOCYTES # BLD AUTO: 0.4 K/UL (ref 2–10)
MONOCYTES NFR BLD AUTO: 2.6 % (ref 0–11)
NEUTROPHILS # BLD AUTO: 13.7 K/UL (ref 1.8–8.9)
NEUTROPHILS NFR BLD AUTO: 94.8 % (ref 38.5–71.5)
PHOSPHATE SERPL-MCNC: 3.8 MG/DL (ref 2.5–4.9)
PLATELET # BLD AUTO: 254 K/UL (ref 152–348)
POTASSIUM SERPL-SCNC: 5.2 MMOL/L (ref 3.5–5.1)
RBC # BLD AUTO: 4.21 MIL/UL (ref 4.06–5.63)
WBC # BLD AUTO: 14.5 K/UL (ref 3.6–10.2)
WS STN SPEC: 6.2 G/DL (ref 6.4–8.2)

## 2020-04-27 RX ADMIN — DEXTROSE SCH MLS/HR: 50 INJECTION, SOLUTION INTRAVENOUS at 20:50

## 2020-04-27 RX ADMIN — SODIUM CHLORIDE PRN UNIT: 9 INJECTION, SOLUTION INTRAVENOUS at 17:22

## 2020-04-27 RX ADMIN — Medication SCH ML: at 16:57

## 2020-04-27 RX ADMIN — Medication SCH EACH: at 11:59

## 2020-04-27 RX ADMIN — DEXAMETHASONE SODIUM PHOSPHATE SCH MG: 4 INJECTION, SOLUTION INTRAMUSCULAR; INTRAVENOUS at 21:26

## 2020-04-27 RX ADMIN — Medication SCH EACH: at 21:05

## 2020-04-27 RX ADMIN — FERROUS SULFATE TAB 325 MG (65 MG ELEMENTAL FE) SCH MG: 325 (65 FE) TAB at 09:25

## 2020-04-27 RX ADMIN — PANTOPRAZOLE SODIUM SCH MG: 40 TABLET, DELAYED RELEASE ORAL at 09:26

## 2020-04-27 RX ADMIN — FLUTICASONE FUROATE AND VILANTEROL TRIFENATATE SCH EACH: 100; 25 POWDER RESPIRATORY (INHALATION) at 09:25

## 2020-04-27 RX ADMIN — Medication SCH ML: at 09:27

## 2020-04-27 RX ADMIN — DEXAMETHASONE SODIUM PHOSPHATE SCH MG: 4 INJECTION, SOLUTION INTRAMUSCULAR; INTRAVENOUS at 09:25

## 2020-04-27 RX ADMIN — Medication SCH EACH: at 06:37

## 2020-04-27 RX ADMIN — LOSARTAN POTASSIUM SCH MG: 25 TABLET, FILM COATED ORAL at 09:26

## 2020-04-27 RX ADMIN — FERROUS SULFATE TAB 325 MG (65 MG ELEMENTAL FE) SCH MG: 325 (65 FE) TAB at 20:51

## 2020-04-27 RX ADMIN — Medication SCH EACH: at 17:14

## 2020-04-27 RX ADMIN — DEXTROSE SCH MLS/HR: 5 SOLUTION INTRAVENOUS at 21:55

## 2020-04-27 RX ADMIN — LOSARTAN POTASSIUM SCH MG: 25 TABLET, FILM COATED ORAL at 20:51

## 2020-04-27 RX ADMIN — HYDROCODONE BITARTRATE AND ACETAMINOPHEN PRN TAB: 5; 325 TABLET ORAL at 17:15

## 2020-04-27 RX ADMIN — INSULIN GLARGINE SCH UNITS: 100 INJECTION, SOLUTION SUBCUTANEOUS at 21:05

## 2020-04-27 RX ADMIN — AMLODIPINE BESYLATE SCH MG: 5 TABLET ORAL at 09:27

## 2020-04-27 RX ADMIN — AMLODIPINE BESYLATE SCH MG: 5 TABLET ORAL at 16:56

## 2020-04-27 RX ADMIN — DILTIAZEM HYDROCHLORIDE SCH MG: 120 CAPSULE, EXTENDED RELEASE ORAL at 09:25

## 2020-04-27 RX ADMIN — SODIUM CHLORIDE PRN UNIT: 9 INJECTION, SOLUTION INTRAVENOUS at 12:07

## 2020-04-27 RX ADMIN — SODIUM CHLORIDE PRN UNIT: 9 INJECTION, SOLUTION INTRAVENOUS at 21:06

## 2020-04-27 NOTE — NUR
RECEIVED PATIENT IN BED, ALERT AND VERBALLY RESPONSIVE. AFEBRILE. NO RESPIRATORY DISTRESS. 
ON 2L/MIN VIA NC, TOLERATED WELL, O2 SAT 98%. RESPIRATIONS EVEN AND UNLABORED. NO COMPLAINTS 
OF PAIN NOTED. PATIENT RECEIVED IV ATB THERAPY CEFTRIAXONE AND AZITHROMYCIN PER MD ORDER. 
TOLERATED WELL. NO ASE D/T ATB OBSERVED. BLOOD SUGAR CHECKED AND . COVERAGE 
ADMINISTERED AS ORDERED. WILL CONTINUE TO MONITOR PATIENT AND KEEP CLEAN, WARM, AND DRY. ALL 
NEEDS ATTENDED. WILL CONTINUE TO OBSERVE FALL AND SAFETY PRECAUTIONS.

## 2020-04-28 VITALS — DIASTOLIC BLOOD PRESSURE: 60 MMHG | SYSTOLIC BLOOD PRESSURE: 143 MMHG

## 2020-04-28 VITALS — DIASTOLIC BLOOD PRESSURE: 60 MMHG | SYSTOLIC BLOOD PRESSURE: 145 MMHG

## 2020-04-28 LAB
BASOPHILS # BLD AUTO: 0 K/UL (ref 0–8)
BASOPHILS NFR BLD AUTO: 0.1 % (ref 0–2)
BUN SERPL-MCNC: 26 MG/DL (ref 7–18)
CHLORIDE SERPL-SCNC: 95 MMOL/L (ref 98–107)
CO2 SERPL-SCNC: 34 MMOL/L (ref 21–32)
CREAT SERPL-MCNC: 0.8 MG/DL (ref 0.6–1.3)
EOSINOPHIL # BLD AUTO: 0 K/UL (ref 0–0.7)
EOSINOPHIL NFR BLD AUTO: 0 % (ref 0–7)
GLUCOSE SERPL-MCNC: 158 MG/DL (ref 74–106)
HCT VFR BLD AUTO: 36.5 % (ref 36.7–47.1)
HGB BLD-MCNC: 11.7 G/DL (ref 12.5–16.3)
LYMPHOCYTES # BLD AUTO: 0.4 K/UL (ref 20–40)
LYMPHOCYTES NFR BLD AUTO: 2 % (ref 20.5–51.5)
MCH RBC QN AUTO: 26.2 UUG (ref 23.8–33.4)
MCHC RBC AUTO-ENTMCNC: 32 G/DL (ref 32.5–36.3)
MCV RBC AUTO: 81.3 FL (ref 73–96.2)
MONOCYTES # BLD AUTO: 0.3 K/UL (ref 2–10)
MONOCYTES NFR BLD AUTO: 1.5 % (ref 0–11)
NEUTROPHILS # BLD AUTO: 17.6 K/UL (ref 1.8–8.9)
NEUTROPHILS NFR BLD AUTO: 96.4 % (ref 38.5–71.5)
PLATELET # BLD AUTO: 235 K/UL (ref 152–348)
POTASSIUM SERPL-SCNC: 5.1 MMOL/L (ref 3.5–5.1)
RBC # BLD AUTO: 4.48 MIL/UL (ref 4.06–5.63)
WBC # BLD AUTO: 18.3 K/UL (ref 3.6–10.2)

## 2020-04-28 RX ADMIN — CLOTRIMAZOLE SCH MG: 10 LOZENGE ORAL at 14:09

## 2020-04-28 RX ADMIN — Medication SCH ML: at 08:25

## 2020-04-28 RX ADMIN — INSULIN GLARGINE SCH UNITS: 100 INJECTION, SOLUTION SUBCUTANEOUS at 20:44

## 2020-04-28 RX ADMIN — CLOTRIMAZOLE SCH MG: 10 LOZENGE ORAL at 16:53

## 2020-04-28 RX ADMIN — SODIUM CHLORIDE PRN UNIT: 9 INJECTION, SOLUTION INTRAVENOUS at 12:26

## 2020-04-28 RX ADMIN — FERROUS SULFATE TAB 325 MG (65 MG ELEMENTAL FE) SCH MG: 325 (65 FE) TAB at 20:32

## 2020-04-28 RX ADMIN — IPRATROPIUM BROMIDE SCH MG: 0.5 SOLUTION RESPIRATORY (INHALATION) at 14:20

## 2020-04-28 RX ADMIN — PANTOPRAZOLE SODIUM SCH MG: 40 TABLET, DELAYED RELEASE ORAL at 08:27

## 2020-04-28 RX ADMIN — LOSARTAN POTASSIUM SCH MG: 25 TABLET, FILM COATED ORAL at 10:09

## 2020-04-28 RX ADMIN — DILTIAZEM HYDROCHLORIDE SCH MG: 120 CAPSULE, EXTENDED RELEASE ORAL at 08:46

## 2020-04-28 RX ADMIN — FERROUS SULFATE TAB 325 MG (65 MG ELEMENTAL FE) SCH MG: 325 (65 FE) TAB at 08:27

## 2020-04-28 RX ADMIN — Medication SCH EACH: at 16:52

## 2020-04-28 RX ADMIN — DEXAMETHASONE SODIUM PHOSPHATE SCH MG: 4 INJECTION, SOLUTION INTRAMUSCULAR; INTRAVENOUS at 20:32

## 2020-04-28 RX ADMIN — Medication SCH ML: at 17:07

## 2020-04-28 RX ADMIN — Medication SCH EACH: at 06:47

## 2020-04-28 RX ADMIN — AMLODIPINE BESYLATE SCH MG: 5 TABLET ORAL at 17:04

## 2020-04-28 RX ADMIN — IPRATROPIUM BROMIDE SCH MG: 0.5 SOLUTION RESPIRATORY (INHALATION) at 19:41

## 2020-04-28 RX ADMIN — LOSARTAN POTASSIUM SCH MG: 25 TABLET, FILM COATED ORAL at 20:34

## 2020-04-28 RX ADMIN — FLUTICASONE FUROATE AND VILANTEROL TRIFENATATE SCH EACH: 100; 25 POWDER RESPIRATORY (INHALATION) at 08:26

## 2020-04-28 RX ADMIN — AMLODIPINE BESYLATE SCH MG: 5 TABLET ORAL at 08:46

## 2020-04-28 RX ADMIN — CLOTRIMAZOLE SCH MG: 10 LOZENGE ORAL at 20:32

## 2020-04-28 RX ADMIN — DEXTROSE SCH MLS/HR: 50 INJECTION, SOLUTION INTRAVENOUS at 20:23

## 2020-04-28 RX ADMIN — Medication SCH EACH: at 12:14

## 2020-04-28 RX ADMIN — DEXAMETHASONE SODIUM PHOSPHATE SCH MG: 4 INJECTION, SOLUTION INTRAMUSCULAR; INTRAVENOUS at 08:26

## 2020-04-28 RX ADMIN — DEXTROSE SCH MLS/HR: 5 SOLUTION INTRAVENOUS at 21:49

## 2020-04-28 RX ADMIN — Medication SCH EACH: at 20:42

## 2020-04-28 RX ADMIN — SODIUM CHLORIDE PRN UNIT: 9 INJECTION, SOLUTION INTRAVENOUS at 17:29

## 2020-04-28 RX ADMIN — SODIUM CHLORIDE PRN UNIT: 9 INJECTION, SOLUTION INTRAVENOUS at 08:49

## 2020-04-28 RX ADMIN — LEVALBUTEROL HYDROCHLORIDE SCH MG: 0.63 SOLUTION RESPIRATORY (INHALATION) at 19:41

## 2020-04-28 RX ADMIN — SODIUM CHLORIDE PRN UNIT: 9 INJECTION, SOLUTION INTRAVENOUS at 20:46

## 2020-04-28 NOTE — NUR
Patient remains alert, oriented x 3, not in any form of distress. He denies any pain at this 
time. Patient seen and examined during the shift by Elissa Zelaya NP, NP made aware that 
patient stated current inhaler use doesn't work for him. Per NP will check his medications. 
Patient participated with PT and OT during the shift.  Patient complained of constipation, 
given PRN MOM. Will continue to monitor and will endorse accordingly to night shift nurse.

## 2020-04-28 NOTE — NUR
PATIENT ASLEEP AT THIS TIME, HAD INTERMITTENT SLEEP TO URINATE WITH URINAL. ABLE TO FALL 
BACK ASLEEP WITH NO DIFFICULTY. CONTINUES TO BE ON 2L/MIN OF O2 VIA NC. TOLERATED WELL. NO 
BM NOTED DURING SHIFT. ALL NEEDS ANTICIPATED AND ATTENDED. FALL AND SAFETY PRECAUTIONS 
OBSERVED.

## 2020-04-28 NOTE — NUR
Social Work Note/PH9 Post Stroke Depression Screening:



 met with patient and conducted a PHQ-9 (Post Stroke Depression Screening) in 
which the patient score of a level of 1 for depression. Patient does not require a 
psychiatric consult at this time.

## 2020-04-28 NOTE — NUR
Awake, in bed, watching [employer] at this time. Denies any pain/discomforts at thsi 
time. No s/s of respiratory distress. Continuos O2 at 2L via NC, saturating 98% at this 
time. HOB elevated. Left FA HL intact and patent. No s/s of infiltration noted. Urinated via 
urinal with guillermo colored urine output. Safety measures and fall precaution maintained. 
Continue care as planned.

## 2020-04-29 VITALS — DIASTOLIC BLOOD PRESSURE: 52 MMHG | SYSTOLIC BLOOD PRESSURE: 116 MMHG

## 2020-04-29 VITALS — DIASTOLIC BLOOD PRESSURE: 51 MMHG | SYSTOLIC BLOOD PRESSURE: 128 MMHG

## 2020-04-29 VITALS — SYSTOLIC BLOOD PRESSURE: 125 MMHG | DIASTOLIC BLOOD PRESSURE: 57 MMHG

## 2020-04-29 VITALS — SYSTOLIC BLOOD PRESSURE: 129 MMHG | DIASTOLIC BLOOD PRESSURE: 47 MMHG

## 2020-04-29 LAB
AMORPH URATE CRY URNS QL MICRO: (no result) /HPF
APPEARANCE UR: CLEAR
BASOPHILS # BLD AUTO: 0 K/UL (ref 0–8)
BASOPHILS NFR BLD AUTO: 0.1 % (ref 0–2)
BILIRUB UR QL STRIP: NEGATIVE
BUN SERPL-MCNC: 31 MG/DL (ref 7–18)
CHLORIDE SERPL-SCNC: 94 MMOL/L (ref 98–107)
CO2 SERPL-SCNC: 33 MMOL/L (ref 21–32)
COLOR UR: YELLOW
CREAT SERPL-MCNC: 0.8 MG/DL (ref 0.6–1.3)
DEPRECATED SQUAMOUS URNS QL MICRO: (no result) /HPF
EOSINOPHIL # BLD AUTO: 0 K/UL (ref 0–0.7)
EOSINOPHIL NFR BLD AUTO: 0 % (ref 0–7)
GLUCOSE SERPL-MCNC: 179 MG/DL (ref 74–106)
GLUCOSE UR STRIP-MCNC: (no result) MG/DL
HCT VFR BLD AUTO: 33.9 % (ref 36.7–47.1)
HGB BLD-MCNC: 11.1 G/DL (ref 12.5–16.3)
HGB UR QL STRIP: NEGATIVE
KETONES UR STRIP-MCNC: NEGATIVE MG/DL
LEUKOCYTE ESTERASE UR QL STRIP: NEGATIVE
LYMPHOCYTES # BLD AUTO: 0.3 K/UL (ref 20–40)
LYMPHOCYTES NFR BLD AUTO: 1.1 % (ref 20.5–51.5)
MAGNESIUM SERPL-MCNC: 2.2 MG/DL (ref 1.8–2.4)
MCH RBC QN AUTO: 26.5 UUG (ref 23.8–33.4)
MCHC RBC AUTO-ENTMCNC: 33 G/DL (ref 32.5–36.3)
MCV RBC AUTO: 81.2 FL (ref 73–96.2)
MONOCYTES # BLD AUTO: 0.4 K/UL (ref 2–10)
MONOCYTES NFR BLD AUTO: 1.6 % (ref 0–11)
NEUTROPHILS # BLD AUTO: 25.3 K/UL (ref 1.8–8.9)
NEUTROPHILS NFR BLD AUTO: 97.2 % (ref 38.5–71.5)
NITRITE UR QL STRIP: NEGATIVE
PH UR STRIP: 5.5 [PH] (ref 5–8)
PHOSPHATE SERPL-MCNC: 3.3 MG/DL (ref 2.5–4.9)
PLATELET # BLD AUTO: 204 K/UL (ref 152–348)
POTASSIUM SERPL-SCNC: 4.7 MMOL/L (ref 3.5–5.1)
RBC # BLD AUTO: 4.18 MIL/UL (ref 4.06–5.63)
RBC #/AREA URNS HPF: (no result) /HPF (ref 0–3)
SP GR UR STRIP: 1.02 (ref 1–1.03)
UROBILINOGEN UR STRIP-MCNC: 0.2 E.U./DL
WBC # BLD AUTO: 26 K/UL (ref 3.6–10.2)
WBC #/AREA URNS HPF: (no result) /HPF
WBC #/AREA URNS HPF: (no result) /HPF (ref 0–3)

## 2020-04-29 RX ADMIN — SODIUM CHLORIDE PRN UNIT: 9 INJECTION, SOLUTION INTRAVENOUS at 08:09

## 2020-04-29 RX ADMIN — FERROUS SULFATE TAB 325 MG (65 MG ELEMENTAL FE) SCH MG: 325 (65 FE) TAB at 08:11

## 2020-04-29 RX ADMIN — FERROUS SULFATE TAB 325 MG (65 MG ELEMENTAL FE) SCH MG: 325 (65 FE) TAB at 21:02

## 2020-04-29 RX ADMIN — DEXAMETHASONE SODIUM PHOSPHATE SCH MG: 4 INJECTION, SOLUTION INTRAMUSCULAR; INTRAVENOUS at 21:06

## 2020-04-29 RX ADMIN — HYDROCODONE BITARTRATE AND ACETAMINOPHEN PRN TAB: 5; 325 TABLET ORAL at 04:17

## 2020-04-29 RX ADMIN — Medication SCH EACH: at 21:07

## 2020-04-29 RX ADMIN — PANTOPRAZOLE SODIUM SCH MG: 40 TABLET, DELAYED RELEASE ORAL at 08:06

## 2020-04-29 RX ADMIN — Medication SCH ML: at 17:33

## 2020-04-29 RX ADMIN — SODIUM CHLORIDE PRN UNIT: 9 INJECTION, SOLUTION INTRAVENOUS at 12:09

## 2020-04-29 RX ADMIN — Medication SCH ML: at 08:21

## 2020-04-29 RX ADMIN — IPRATROPIUM BROMIDE SCH MG: 0.5 SOLUTION RESPIRATORY (INHALATION) at 00:30

## 2020-04-29 RX ADMIN — Medication SCH EACH: at 12:05

## 2020-04-29 RX ADMIN — CLOTRIMAZOLE SCH MG: 10 LOZENGE ORAL at 17:07

## 2020-04-29 RX ADMIN — ALBUTEROL SULFATE SCH MG: 1.25 SOLUTION RESPIRATORY (INHALATION) at 13:21

## 2020-04-29 RX ADMIN — SODIUM CHLORIDE PRN UNIT: 9 INJECTION, SOLUTION INTRAVENOUS at 17:13

## 2020-04-29 RX ADMIN — LEVALBUTEROL HYDROCHLORIDE SCH MG: 0.63 SOLUTION RESPIRATORY (INHALATION) at 00:30

## 2020-04-29 RX ADMIN — DEXAMETHASONE SODIUM PHOSPHATE SCH MG: 4 INJECTION, SOLUTION INTRAMUSCULAR; INTRAVENOUS at 08:13

## 2020-04-29 RX ADMIN — CLOTRIMAZOLE SCH MG: 10 LOZENGE ORAL at 13:48

## 2020-04-29 RX ADMIN — IPRATROPIUM BROMIDE SCH MG: 0.5 SOLUTION RESPIRATORY (INHALATION) at 08:30

## 2020-04-29 RX ADMIN — SODIUM CHLORIDE PRN UNIT: 9 INJECTION, SOLUTION INTRAVENOUS at 21:08

## 2020-04-29 RX ADMIN — DOCUSATE SODIUM SCH MG: 50 LIQUID ORAL at 21:03

## 2020-04-29 RX ADMIN — IPRATROPIUM BROMIDE SCH MG: 0.5 SOLUTION RESPIRATORY (INHALATION) at 18:49

## 2020-04-29 RX ADMIN — ALBUTEROL SULFATE SCH MG: 1.25 SOLUTION RESPIRATORY (INHALATION) at 18:50

## 2020-04-29 RX ADMIN — AMLODIPINE BESYLATE SCH MG: 5 TABLET ORAL at 09:55

## 2020-04-29 RX ADMIN — CLOTRIMAZOLE SCH MG: 10 LOZENGE ORAL at 21:05

## 2020-04-29 RX ADMIN — INSULIN GLARGINE SCH UNITS: 100 INJECTION, SOLUTION SUBCUTANEOUS at 21:09

## 2020-04-29 RX ADMIN — AMLODIPINE BESYLATE SCH MG: 5 TABLET ORAL at 17:09

## 2020-04-29 RX ADMIN — LEVALBUTEROL HYDROCHLORIDE SCH MG: 0.63 SOLUTION RESPIRATORY (INHALATION) at 08:30

## 2020-04-29 RX ADMIN — CLOTRIMAZOLE SCH MG: 10 LOZENGE ORAL at 08:11

## 2020-04-29 RX ADMIN — IPRATROPIUM BROMIDE SCH MG: 0.5 SOLUTION RESPIRATORY (INHALATION) at 13:21

## 2020-04-29 RX ADMIN — LOSARTAN POTASSIUM SCH MG: 25 TABLET, FILM COATED ORAL at 09:55

## 2020-04-29 RX ADMIN — FLUTICASONE FUROATE AND VILANTEROL TRIFENATATE SCH EACH: 100; 25 POWDER RESPIRATORY (INHALATION) at 09:44

## 2020-04-29 RX ADMIN — DILTIAZEM HYDROCHLORIDE SCH MG: 120 CAPSULE, EXTENDED RELEASE ORAL at 08:11

## 2020-04-29 RX ADMIN — Medication SCH EACH: at 05:57

## 2020-04-29 RX ADMIN — Medication SCH EACH: at 16:49

## 2020-04-29 RX ADMIN — LOSARTAN POTASSIUM SCH MG: 25 TABLET, FILM COATED ORAL at 21:02

## 2020-04-29 NOTE — NUR
Patient remains alert, oriented x 4 during the shift. All due medications administered and 
tolerated well. No complain of any pain or discomfort at this time. No acute distress noted, 
 on room air. Assisted patient to the toilet for bowel movement then back to bed. Patient 
seen by Elissa LANDIS during the shift, informed NP regarding episode of productive cough. Per 
NP patient is already on antibiotic and pulmonologist already made changes with his 
medications. Also NP made aware regarding abnormal lab results today and per NP new orders 
already in place. Patient participated with PT and OT. Safety measures maintained. Call 
light and frequently used items placed within patient's reach.

## 2020-04-29 NOTE — NUR
Shift End Report: VS stable. Medicated once for dull ache like chest pain in scale of 6/10 
with relief. No further complaint presented. No s/s of hypo/hyperglycemia. No s/s of adverse 
reaction noted from antibiotics. HL on RFA intact and patent. No s/s of infiltration. Voided 
well per urinal. All needs attended and met. Slept good.  No significant event reported all 
night. Continue current rehab plan of care.

## 2020-04-29 NOTE — NUR
awake upon initial rounds. AAOx3-4 forgetful at times. No acute distress noted. 

On continous O2@2L via nasal cannula, pulse ox 99%. Needs attended. VSS 

Due meds given as scheduled. Accucheck 276 with 6 units humalog given as

coverage. Lantus 15 units also given as scheduled. On Fluid restrictions, 900

ml/24hrs. Patient compliant. Fall precautions maintained. Siderails up for safety.

Uses urinal when voiding. BM this shift. Kept comfortable. IV ABT given as ordered

with no side effects noted. Will monitor patient.

## 2020-04-30 ENCOUNTER — HOSPITAL ENCOUNTER (INPATIENT)
Dept: HOSPITAL 12 - MEDSURG3 | Age: 75
Discharge: HOME HEALTH | DRG: 720 | End: 2020-04-30
Payer: MEDICAID

## 2020-04-30 VITALS — DIASTOLIC BLOOD PRESSURE: 51 MMHG | SYSTOLIC BLOOD PRESSURE: 132 MMHG

## 2020-04-30 VITALS — DIASTOLIC BLOOD PRESSURE: 52 MMHG | SYSTOLIC BLOOD PRESSURE: 98 MMHG

## 2020-04-30 VITALS — SYSTOLIC BLOOD PRESSURE: 98 MMHG | DIASTOLIC BLOOD PRESSURE: 52 MMHG

## 2020-04-30 VITALS — SYSTOLIC BLOOD PRESSURE: 127 MMHG | DIASTOLIC BLOOD PRESSURE: 57 MMHG

## 2020-04-30 VITALS — SYSTOLIC BLOOD PRESSURE: 116 MMHG | DIASTOLIC BLOOD PRESSURE: 46 MMHG

## 2020-04-30 DIAGNOSIS — I10: ICD-10-CM

## 2020-04-30 DIAGNOSIS — Z85.6: ICD-10-CM

## 2020-04-30 DIAGNOSIS — E11.9: ICD-10-CM

## 2020-04-30 DIAGNOSIS — A41.9: Primary | ICD-10-CM

## 2020-04-30 DIAGNOSIS — E22.2: ICD-10-CM

## 2020-04-30 DIAGNOSIS — J44.9: ICD-10-CM

## 2020-04-30 DIAGNOSIS — I25.10: ICD-10-CM

## 2020-04-30 DIAGNOSIS — Z85.038: ICD-10-CM

## 2020-04-30 DIAGNOSIS — R91.8: ICD-10-CM

## 2020-04-30 DIAGNOSIS — J18.9: ICD-10-CM

## 2020-04-30 LAB
BASOPHILS # BLD AUTO: 0.1 K/UL (ref 0–8)
BASOPHILS NFR BLD AUTO: 0.3 % (ref 0–2)
BUN SERPL-MCNC: 34 MG/DL (ref 7–18)
CHLORIDE SERPL-SCNC: 92 MMOL/L (ref 98–107)
CO2 SERPL-SCNC: 27 MMOL/L (ref 21–32)
CREAT SERPL-MCNC: 1.2 MG/DL (ref 0.6–1.3)
EOSINOPHIL # BLD AUTO: 0 K/UL (ref 0–0.7)
EOSINOPHIL NFR BLD AUTO: 0 % (ref 0–7)
GLUCOSE SERPL-MCNC: 144 MG/DL (ref 74–106)
HCT VFR BLD AUTO: 38.5 % (ref 36.7–47.1)
HGB BLD-MCNC: 12.4 G/DL (ref 12.5–16.3)
LYMPHOCYTES # BLD AUTO: 0.3 K/UL (ref 20–40)
LYMPHOCYTES NFR BLD AUTO: 0.7 % (ref 20.5–51.5)
LYMPHOCYTES NFR BLD MANUAL: 1 % (ref 20–40)
MAGNESIUM SERPL-MCNC: 2.2 MG/DL (ref 1.8–2.4)
MCH RBC QN AUTO: 26.4 UUG (ref 23.8–33.4)
MCHC RBC AUTO-ENTMCNC: 32 G/DL (ref 32.5–36.3)
MCV RBC AUTO: 82.2 FL (ref 73–96.2)
MONOCYTES # BLD AUTO: 0.7 K/UL (ref 2–10)
MONOCYTES NFR BLD AUTO: 1.8 % (ref 0–11)
MONOCYTES NFR BLD MANUAL: 4 % (ref 2–10)
NEUTROPHILS # BLD AUTO: 36.8 K/UL (ref 1.8–8.9)
NEUTROPHILS NFR BLD AUTO: 97.2 % (ref 38.5–71.5)
NEUTS BAND NFR BLD MANUAL: 3 % (ref 0–10)
NEUTS SEG NFR BLD MANUAL: 92 % (ref 42–75)
PHOSPHATE SERPL-MCNC: 3.7 MG/DL (ref 2.5–4.9)
PLATELET # BLD AUTO: 242 K/UL (ref 152–348)
POTASSIUM SERPL-SCNC: 4.6 MMOL/L (ref 3.5–5.1)
RBC # BLD AUTO: 4.68 MIL/UL (ref 4.06–5.63)
WBC # BLD AUTO: 37.8 K/UL (ref 3.6–10.2)

## 2020-04-30 PROCEDURE — G0378 HOSPITAL OBSERVATION PER HR: HCPCS

## 2020-04-30 PROCEDURE — A4663 DIALYSIS BLOOD PRESSURE CUFF: HCPCS

## 2020-04-30 RX ADMIN — ACETAMINOPHEN PRN MG: 325 TABLET ORAL at 16:18

## 2020-04-30 RX ADMIN — LOSARTAN POTASSIUM SCH MG: 25 TABLET, FILM COATED ORAL at 08:52

## 2020-04-30 RX ADMIN — AMLODIPINE BESYLATE SCH MG: 5 TABLET ORAL at 17:00

## 2020-04-30 RX ADMIN — DEXAMETHASONE SODIUM PHOSPHATE SCH MG: 4 INJECTION, SOLUTION INTRAMUSCULAR; INTRAVENOUS at 09:03

## 2020-04-30 RX ADMIN — AMLODIPINE BESYLATE SCH MG: 5 TABLET ORAL at 09:04

## 2020-04-30 RX ADMIN — Medication SCH EACH: at 11:35

## 2020-04-30 RX ADMIN — SODIUM CHLORIDE PRN UNIT: 9 INJECTION, SOLUTION INTRAVENOUS at 11:38

## 2020-04-30 RX ADMIN — CLOTRIMAZOLE SCH MG: 10 LOZENGE ORAL at 09:04

## 2020-04-30 RX ADMIN — IPRATROPIUM BROMIDE SCH MG: 0.5 SOLUTION RESPIRATORY (INHALATION) at 14:06

## 2020-04-30 RX ADMIN — LEVALBUTEROL HYDROCHLORIDE SCH MG: 0.63 SOLUTION RESPIRATORY (INHALATION) at 14:06

## 2020-04-30 RX ADMIN — PANTOPRAZOLE SODIUM SCH MG: 40 TABLET, DELAYED RELEASE ORAL at 06:35

## 2020-04-30 RX ADMIN — CLOTRIMAZOLE SCH MG: 10 LOZENGE ORAL at 17:38

## 2020-04-30 RX ADMIN — LEVALBUTEROL HYDROCHLORIDE SCH MG: 0.63 SOLUTION RESPIRATORY (INHALATION) at 00:58

## 2020-04-30 RX ADMIN — DOCUSATE SODIUM SCH MG: 50 LIQUID ORAL at 08:52

## 2020-04-30 RX ADMIN — CLOTRIMAZOLE SCH MG: 10 LOZENGE ORAL at 13:09

## 2020-04-30 RX ADMIN — Medication SCH EACH: at 06:37

## 2020-04-30 RX ADMIN — Medication SCH ML: at 17:00

## 2020-04-30 RX ADMIN — FERROUS SULFATE TAB 325 MG (65 MG ELEMENTAL FE) SCH MG: 325 (65 FE) TAB at 08:52

## 2020-04-30 RX ADMIN — LEVALBUTEROL HYDROCHLORIDE SCH MG: 0.63 SOLUTION RESPIRATORY (INHALATION) at 08:21

## 2020-04-30 RX ADMIN — Medication SCH ML: at 08:00

## 2020-04-30 RX ADMIN — IPRATROPIUM BROMIDE SCH MG: 0.5 SOLUTION RESPIRATORY (INHALATION) at 08:21

## 2020-04-30 RX ADMIN — DILTIAZEM HYDROCHLORIDE SCH MG: 120 CAPSULE, EXTENDED RELEASE ORAL at 08:53

## 2020-04-30 RX ADMIN — Medication SCH EACH: at 17:26

## 2020-04-30 RX ADMIN — FLUTICASONE FUROATE AND VILANTEROL TRIFENATATE SCH EACH: 100; 25 POWDER RESPIRATORY (INHALATION) at 09:03

## 2020-04-30 RX ADMIN — SODIUM CHLORIDE PRN UNIT: 9 INJECTION, SOLUTION INTRAVENOUS at 17:37

## 2020-04-30 RX ADMIN — IPRATROPIUM BROMIDE SCH MG: 0.5 SOLUTION RESPIRATORY (INHALATION) at 00:58

## 2020-04-30 NOTE — NUR
Patient will transfer to [facility] for continuity of care and observation. Rehab MD patterson. SABIHA Bowers and MD Owusu notified. MD Owusu agree for covid testing. will endorse to RN 

-------------------------------------------------------------------------------

Addendum: 04/30/20 at 1809 by JOSE GOODEN RN RN

-------------------------------------------------------------------------------

As per Elissa, DX will be sepsis

## 2020-04-30 NOTE — NUR
Received patient awake in bed. Alert and oriented x3 in stable condition. Continue on oxygen 
via nasal cannula ar 2LPM. SPO2-94%. Patient WBC 37.8. NP Elissa aware. Started Cefepime IV 
for PNA and increase WBC. Patient verbalize he wants to go home, NP aware. explain to 
patient reason for stay in the hospital with good effect. Patient seen and examined by MD Owusu. no new order. Patient continue on breathing treatment for SOB/COPD. Patient 
continue accucheck with sliding scale protocol. tolerated well. Continue fluid restriction 
900 cc per day for. Patient refuse therapy today. NP Elissa aware, agree to advice to lessen 
therapy activities. will continue monitor

## 2020-04-30 NOTE — NUR
Patient temperature- 100' F relayed to MD Owusu. Tylenol 650mg given. Ice pack given to 
underarm. no complaint of SOB/discomfort. no new order. will continue monitor

## 2020-04-30 NOTE — NUR
FAMILY MAITEVERY WISHES TO DISCHARGE THE PATIENT HOME WITH HOME HEALTH. NOTIFY DR OVALLE 
WITH ORDER.

## 2020-04-30 NOTE — NUR
PHARMACY CLINICAL NOTES; VANCOMYCIN DOSING



S: 73 YO male with DX of possible sepsis of unknow origin, MD ordered Vancomycin and pt is 
on Cefepime



O: BUN/SCR 34/1.2; WBC 37.8. TEMP 100, DOSING  LBS



A/P: Will dose Vanco as 1250 mg q22h , estimated peak is 40 and trough is 16, plan to 
monitor trough prior to 4th dose of Vanco (not ordered yet). Will continue to monitor renal 
fxn and levels and make the necessary adjustments if needed.

## 2020-04-30 NOTE — NUR
NOTIFY DR. JUANCHO OVALLE TO DISCHARGE PATIENT AND FAMILY NEEDS PRESCRIPTIONS, DR. OVALLE SAID HE WILL FINISHED THE DISCHARGE TOMORROW.  PATIENT WAS PICKED UP BY FAMILY 
AND FAMILY WAS NOTIFIED REGARDING THE DISCHARGE PAPERS. FAMILY WAS GIVEN COPIES OF LAB WORKS 
DONE AND ALL IMAGING DONE FOR THE PATIENT, INSTRUCTED FAMILY IF THEY CAN  THE PAPER 
WORKS TOMORROW.  INSTRUCTED FAMILY THAT PATIENT NEEDS OXYGEN FOR ASSIST WHEN THEY GET HOME.

## 2020-05-12 NOTE — NUR
Update: Spoke with HOWARD from [facility] earlier today and informed him that this patient had 
been referred to [facility]. Per HOWARD: he will follow up with Keshia to confirm that [facility] 
is providing services. 

1424pm-Per Keshia: she was unable to obtain authorization from [facility] [facility] after 
several attempts however, she will follow up with authorization today. 

1647pm-Mike Schmitt: she was able to reach the  from [facility] and follow 
up with the  Vikki @ [phone] who stated that the patient has been admitted 
to [facility].